# Patient Record
Sex: MALE | Race: WHITE | NOT HISPANIC OR LATINO | Employment: UNEMPLOYED | URBAN - METROPOLITAN AREA
[De-identification: names, ages, dates, MRNs, and addresses within clinical notes are randomized per-mention and may not be internally consistent; named-entity substitution may affect disease eponyms.]

---

## 2017-10-31 ENCOUNTER — HOSPITAL ENCOUNTER (EMERGENCY)
Facility: HOSPITAL | Age: 37
Discharge: HOME | End: 2017-10-31
Attending: EMERGENCY MEDICINE | Admitting: EMERGENCY MEDICINE
Payer: COMMERCIAL

## 2017-10-31 VITALS
RESPIRATION RATE: 17 BRPM | BODY MASS INDEX: 27.98 KG/M2 | TEMPERATURE: 97.9 F | DIASTOLIC BLOOD PRESSURE: 78 MMHG | OXYGEN SATURATION: 100 % | WEIGHT: 225 LBS | SYSTOLIC BLOOD PRESSURE: 146 MMHG | HEART RATE: 83 BPM | HEIGHT: 75 IN

## 2017-10-31 DIAGNOSIS — J45.901 ASTHMA EXACERBATION: Primary | ICD-10-CM

## 2017-10-31 PROCEDURE — 99283 EMERGENCY DEPT VISIT LOW MDM: CPT

## 2017-10-31 PROCEDURE — 94640 AIRWAY INHALATION TREATMENT: CPT

## 2017-10-31 RX ORDER — FLUTICASONE PROPIONATE 110 UG/1
1 AEROSOL, METERED RESPIRATORY (INHALATION) 2 TIMES DAILY
Qty: 1 INHALER | Refills: 0 | Status: SHIPPED | OUTPATIENT
Start: 2017-10-31 | End: 2020-07-20 | Stop reason: CLARIF

## 2017-10-31 RX ORDER — ALBUTEROL SULFATE 2.5 MG/3ML
5 SOLUTION RESPIRATORY (INHALATION) ONCE
Status: COMPLETED | OUTPATIENT
Start: 2017-10-31 | End: 2017-10-31

## 2017-10-31 RX ORDER — ALBUTEROL SULFATE 90 UG/1
2 AEROSOL, METERED RESPIRATORY (INHALATION) EVERY 4 HOURS PRN
Qty: 1 INHALER | Refills: 0 | Status: SHIPPED | OUTPATIENT
Start: 2017-10-31

## 2017-10-31 RX ORDER — ALBUTEROL SULFATE 90 UG/1
2 AEROSOL, METERED RESPIRATORY (INHALATION) ONCE
Status: COMPLETED | OUTPATIENT
Start: 2017-10-31 | End: 2017-10-31

## 2017-10-31 RX ORDER — PREDNISONE 20 MG/1
60 TABLET ORAL ONCE
Status: COMPLETED | OUTPATIENT
Start: 2017-10-31 | End: 2017-10-31

## 2017-10-31 RX ORDER — ALBUTEROL SULFATE 2.5 MG/3ML
SOLUTION RESPIRATORY (INHALATION)
Status: COMPLETED
Start: 2017-10-31 | End: 2017-10-31

## 2017-10-31 RX ADMIN — PREDNISONE 60 MG: 20 TABLET ORAL at 22:35

## 2017-10-31 RX ADMIN — ALBUTEROL SULFATE 5 MG: 2.5 SOLUTION RESPIRATORY (INHALATION) at 22:08

## 2017-10-31 RX ADMIN — ALBUTEROL SULFATE 2 PUFF: 90 AEROSOL, METERED RESPIRATORY (INHALATION) at 22:45

## 2017-10-31 RX ADMIN — IPRATROPIUM BROMIDE 0.5 MG: 0.5 SOLUTION RESPIRATORY (INHALATION) at 22:08

## 2017-10-31 RX ADMIN — Medication 0.5 MG: at 22:08

## 2017-11-01 NOTE — ED PROVIDER NOTES
History  Chief Complaint   Patient presents with    Asthma     Patient reports asthma attack starting at 2115  Patient ran out of albuterol  Patient reports allergic to cat and gave cat bath today     72-year-old male with history of asthma presents with dyspnea, wheezing, and cough after giving his cat a bath  The patient states that he is allergic to cats  Patient has been out of his medications for asthma  He typically takes a daily Advair but he does not know the dose  He is also out of his rescue inhaler  The patient has been admitted to the hospital for asthma exacerbations in the past, he has not been intubated in the past     The patient affirms use of tobacco in the past 90 days  The patient denies any use of illicit drugs, including cocaine  Patient denies any immobilization of at least 3 days or surgery in the past 4 weeks  Patient denies any history of DVT or PE  Patient denies any malignancy with treatment within the past 6 months  Objective  PHYSICAL EXAM:  Constitutional:  No acute distress  Eyes: No scleral icterus or erythema  HENT:  Head normocephalic and atraumatic  Pharynx moist without erythema or exudate  CV:  Normal inspection with no rash, signs of infection, or trauma  Regular rate and rhythm  No murmur  Peripheral pulses intact and equal   Respiratory:  Lungs with decreased air movement, bilateral expiratory wheezing  Abdomen:  Soft, non-tender, non-distended  Back:  No rash or signs of herpes zoster  Skin:  Normal color  Warm and Dry  Extremities:  Non-tender lower extremities without asymmetry; no clinical signs of DVT  No lower extremity edema  Neuro:  Alert  No gross motor deficits  Psych: Normal mood and affect  Medical Decision Making    Dyspnea with a history of asthma and clear inciting event, while there is a broad differential more likely asthma exacerbation  No risk factors for PE  Will reassess after treatment but start corticosteroids  Reassessment:  Patient completely resolved after treatment  Asymptomatic at present  Discussed continued use of inhaled corticosteroids as previously prescribed  I have prescribed fluticasone for the patient and he agrees to follow with his new primary care physician that has been established for him for reassessment  I also prescribed patient a rescue inhaler  We discussed return precautions in detail  History provided by:  Patient  Asthma   Severity:  Moderate  Onset quality:  Sudden  Duration:  1 hour  Timing:  Constant  Progression:  Unchanged  Chronicity:  New  Associated symptoms: cough, shortness of breath and wheezing    Associated symptoms: no abdominal pain, no chest pain, no congestion, no diarrhea, no ear pain, no fatigue, no fever, no headaches, no loss of consciousness, no myalgias, no nausea, no rash, no rhinorrhea, no sore throat and no vomiting        Prior to Admission Medications   Prescriptions Last Dose Informant Patient Reported? Taking?   lidocaine (LIDODERM) 5 %   No No   Sig: Place 1 patch on the skin every 24 hours for 30 days Remove & Discard patch within 12 hours or as directed by MD   methocarbamol (ROBAXIN) 500 mg tablet   No No   Sig: Take 1 tablet by mouth 3 (three) times a day as needed for muscle spasms for up to 30 days Prn muscle spasms      Facility-Administered Medications: None       Past Medical History:   Diagnosis Date    Asthma     Chronic pain     back pain       History reviewed  No pertinent surgical history  History reviewed  No pertinent family history  I have reviewed and agree with the history as documented  Social History   Substance Use Topics    Smoking status: Current Every Day Smoker     Packs/day: 0 50    Smokeless tobacco: Never Used    Alcohol use No        Review of Systems   Constitutional: Negative for fatigue and fever  HENT: Negative for congestion, ear pain, rhinorrhea and sore throat      Respiratory: Positive for cough, shortness of breath and wheezing  Cardiovascular: Negative for chest pain  Gastrointestinal: Negative for abdominal pain, diarrhea, nausea and vomiting  Musculoskeletal: Negative for myalgias  Skin: Negative for rash  Neurological: Negative for loss of consciousness and headaches  All other systems reviewed and are negative        Physical Exam  ED Triage Vitals [10/31/17 2201]   Temperature Pulse Respirations Blood Pressure SpO2   97 9 °F (36 6 °C) 83 22 144/70 93 %      Temp Source Heart Rate Source Patient Position - Orthostatic VS BP Location FiO2 (%)   Temporal Monitor Sitting Right arm --      Pain Score       8           Orthostatic Vital Signs  Vitals:    10/31/17 2201 10/31/17 2215 10/31/17 2237   BP: 144/70  146/78   Pulse: 83 76 83   Patient Position - Orthostatic VS: Sitting  Sitting       Physical Exam    ED Medications  Medications   predniSONE tablet 60 mg (60 mg Oral Given 10/31/17 2235)   albuterol inhalation solution 5 mg (5 mg Nebulization Given 10/31/17 2208)   ipratropium (ATROVENT) 0 02 % inhalation solution 0 5 mg (0 5 mg Nebulization Given 10/31/17 2208)   albuterol (PROVENTIL HFA,VENTOLIN HFA) inhaler 2 puff (2 puffs Inhalation Given 10/31/17 2245)       Diagnostic Studies  Results Reviewed     None                 No orders to display              Procedures  Procedures       Phone Contacts  ED Phone Contact    ED Course  ED Course                          Gilbert' Criteria for PE    Flowsheet Row Most Recent Value   Wells' Criteria for PE   Clinical signs and symptoms of DVT  0 Filed at: 10/31/2017 2220   PE is primary diagnosis or equally likely  0 Filed at: 10/31/2017 2220   HR >100  0 Filed at: 10/31/2017 2220   Immobilization at least 3 days or Surgery in the previous 4 weeks  0 Filed at: 10/31/2017 2220   Previous, objectively diagnosed PE or DVT  0 Filed at: 10/31/2017 2220   Hemoptysis  0 Filed at: 10/31/2017 2220   Malignancy with treatment within 6 months or palliative 0 Filed at: 10/31/2017 2220   Leann Criteria Total  0 Filed at: 10/31/2017 2220            [facility]  CritCare Time    Disposition  Final diagnoses:   Asthma exacerbation     Time reflects when diagnosis was documented in both MDM as applicable and the Disposition within this note     Time User Action Codes Description Comment    10/31/2017 10:39 PM Dacia Rapp [L89 [id number]] Asthma exacerbation       ED Disposition     ED Disposition Condition Comment    Discharge  Ariana Gillespie discharge to home/self care  Condition at discharge: Stable        Follow-up Information    None       Discharge Medication List as of 10/31/2017 10:43 PM      START taking these medications    Details   albuterol (PROVENTIL HFA,VENTOLIN HFA) 90 mcg/act inhaler Inhale 2 puffs every 4 (four) hours as needed for wheezing, Starting Tue 10/31/2017, Print      fluticasone (FLOVENT HFA) 110 MCG/ACT inhaler Inhale 1 puff 2 (two) times a day, Starting Tue 10/31/2017, Print         CONTINUE these medications which have NOT CHANGED    Details   lidocaine (LIDODERM) 5 % Place 1 patch on the skin every 24 hours for 30 days Remove & Discard patch within 12 hours or as directed by MD, Starting 12/12/2016, Until Wed 1/11/17, Print      methocarbamol (ROBAXIN) 500 mg tablet Take 1 tablet by mouth 3 (three) times a day as needed for muscle spasms for up to 30 days Prn muscle spasms, Starting 12/12/2016, Until Wed 1/11/17, Print           No discharge procedures on file      ED Provider  Electronically Signed by           Tramaine Hutchins MD  10/31/17 9586

## 2017-11-01 NOTE — DISCHARGE INSTRUCTIONS
Asthma   WHAT YOU NEED TO KNOW:   Asthma is a lung disease that makes breathing difficult  Chronic inflammation and reactions to triggers narrow the airways in the lungs  Asthma can become life-threatening if it is not managed  DISCHARGE INSTRUCTIONS:   Return to the emergency department if:   · You have severe shortness of breath  · Your lips or nails turn blue or gray  · The skin around your neck and ribs pulls in with each breath  · You have shortness of breath, even after you take your short-term medicine as directed  · Your peak flow numbers are in the red zone of your AAP  Contact your healthcare provider if:   · You run out of medicine before your next refill is due  · Your symptoms get worse  · You need to take more medicine than usual to control your symptoms  · You have questions or concerns about your condition or care  Medicines:   · Medicines  decrease inflammation, open airways, and make it easier to breathe  Medicines may be inhaled, taken as a pill, or injected  Short-term medicines relieve your symptoms quickly  Long-term medicines are used to prevent future attacks  You may also need medicine to help control your allergies  Ask your healthcare provider for more information about the medicine you are given and how to take it safely  · Take your medicine as directed  Contact your healthcare provider if you think your medicine is not helping or if you have side effects  Tell him of her if you are allergic to any medicine  Keep a list of the medicines, vitamins, and herbs you take  Include the amounts, and when and why you take them  Bring the list or the pill bottles to follow-up visits  Carry your medicine list with you in case of an emergency  Follow up with your healthcare provider as directed: You will need to return to make sure your medicine is working and your symptoms are controlled   You may be referred to an asthma specialist  Martha Vega may be asked to keep a record of your peak flow values and bring it with you to your appointments  Write down your questions so you remember to ask them during your visits  Manage your symptoms and prevent future attacks:   · Follow your Asthma Action Plan (AAP)  This is a written plan that you and your healthcare provider create  It explains which medicine you need and when to change doses if necessary  It also explains how you can monitor symptoms and use a peak flow meter  The meter measures how well your lungs are working  · Manage other health conditions , such as allergies, acid reflux, and sleep apnea  · Identify and avoid triggers  These may include pets, dust mites, mold, and cockroaches  · Do not smoke or be around others who smoke  Nicotine and other chemicals in cigarettes and cigars can cause lung damage  Ask your healthcare provider for information if you currently smoke and need help to quit  E-cigarettes or smokeless tobacco still contain nicotine  Talk to your healthcare provider before you use these products  · Ask about the flu vaccine  The flu can make your asthma worse  You may need a yearly flu shot  © 2017 [facility] [facility] Information is for End User's use only and may not be sold, redistributed or otherwise used for commercial purposes  All illustrations and images included in CareNotes® are the copyrighted property of A D A M , Inc  or Ari Rodriguez  The above information is an  only  It is not intended as medical advice for individual conditions or treatments  Talk to your doctor, nurse or pharmacist before following any medical regimen to see if it is safe and effective for you

## 2018-10-11 ENCOUNTER — HOSPITAL ENCOUNTER (EMERGENCY)
Facility: HOSPITAL | Age: 38
Discharge: HOME | End: 2018-10-11
Attending: EMERGENCY MEDICINE | Admitting: EMERGENCY MEDICINE
Payer: COMMERCIAL

## 2018-10-11 ENCOUNTER — APPOINTMENT (EMERGENCY)
Dept: RADIOLOGY | Facility: HOSPITAL | Age: 38
End: 2018-10-11
Payer: COMMERCIAL

## 2018-10-11 VITALS
DIASTOLIC BLOOD PRESSURE: 58 MMHG | RESPIRATION RATE: 16 BRPM | HEART RATE: 66 BPM | TEMPERATURE: 97.9 F | SYSTOLIC BLOOD PRESSURE: 122 MMHG | OXYGEN SATURATION: 99 %

## 2018-10-11 DIAGNOSIS — J20.9 ACUTE BRONCHITIS WITH BRONCHOSPASM: Primary | ICD-10-CM

## 2018-10-11 DIAGNOSIS — J45.901 ASTHMA EXACERBATION: ICD-10-CM

## 2018-10-11 DIAGNOSIS — J06.9 UPPER RESPIRATORY INFECTION WITH COUGH AND CONGESTION: ICD-10-CM

## 2018-10-11 PROCEDURE — 94640 AIRWAY INHALATION TREATMENT: CPT

## 2018-10-11 PROCEDURE — 99283 EMERGENCY DEPT VISIT LOW MDM: CPT

## 2018-10-11 PROCEDURE — 71046 X-RAY EXAM CHEST 2 VIEWS: CPT

## 2018-10-11 RX ORDER — AZITHROMYCIN 250 MG/1
TABLET, FILM COATED ORAL
Qty: 6 TABLET | Refills: 0 | Status: SHIPPED | OUTPATIENT
Start: 2018-10-11 | End: 2018-10-15

## 2018-10-11 RX ORDER — PSEUDOEPHEDRINE HCL 60 MG/1
60 TABLET ORAL EVERY 6 HOURS PRN
Qty: 20 TABLET | Refills: 0 | Status: ON HOLD | OUTPATIENT
Start: 2018-10-11 | End: 2018-10-13

## 2018-10-11 RX ORDER — PREDNISONE 20 MG/1
60 TABLET ORAL ONCE
Status: COMPLETED | OUTPATIENT
Start: 2018-10-11 | End: 2018-10-11

## 2018-10-11 RX ORDER — PSEUDOEPHEDRINE HYDROCHLORIDE 30 MG/1
60 TABLET ORAL ONCE
Status: COMPLETED | OUTPATIENT
Start: 2018-10-11 | End: 2018-10-11

## 2018-10-11 RX ORDER — PREDNISONE 20 MG/1
60 TABLET ORAL DAILY
Qty: 12 TABLET | Refills: 0 | Status: SHIPPED | OUTPATIENT
Start: 2018-10-12 | End: 2018-10-16

## 2018-10-11 RX ORDER — ALBUTEROL SULFATE 90 UG/1
2 AEROSOL, METERED RESPIRATORY (INHALATION) ONCE
Status: COMPLETED | OUTPATIENT
Start: 2018-10-11 | End: 2018-10-11

## 2018-10-11 RX ORDER — GUAIFENESIN 600 MG
1200 TABLET, EXTENDED RELEASE 12 HR ORAL ONCE
Status: COMPLETED | OUTPATIENT
Start: 2018-10-11 | End: 2018-10-11

## 2018-10-11 RX ADMIN — ALBUTEROL SULFATE 5 MG: 2.5 SOLUTION RESPIRATORY (INHALATION) at 15:08

## 2018-10-11 RX ADMIN — PREDNISONE 60 MG: 20 TABLET ORAL at 15:08

## 2018-10-11 RX ADMIN — IPRATROPIUM BROMIDE 0.5 MG: 0.5 SOLUTION RESPIRATORY (INHALATION) at 15:08

## 2018-10-11 RX ADMIN — PSEUDOEPHEDRINE HCL 60 MG: 30 TABLET, COATED ORAL at 15:08

## 2018-10-11 RX ADMIN — ALBUTEROL SULFATE 2 PUFF: 90 AEROSOL, METERED RESPIRATORY (INHALATION) at 15:09

## 2018-10-11 RX ADMIN — GUAIFENESIN 1200 MG: 600 TABLET, EXTENDED RELEASE ORAL at 15:08

## 2018-10-11 NOTE — ED NOTES
Pt started with cold like symptoms one week ago, sick family contacts in household  Progressively worsening  Took daughter's prednisone 10mg Sunday, 5mg Monday and 5mg Tuesday  Reported improvement  Today now worsening symptoms  Out of rescue inhaler       Ty Anderson RN  10/11/18 5660

## 2018-10-11 NOTE — ED PROVIDER NOTES
History  Chief Complaint   Patient presents with    Cold Like Symptoms     pt c/o nasal congestion, fever, cough since saturday     Patient is a 45-year-old male with past medical history of asthma and chronic back pain, presents to the emergency department complaining of cold-like symptoms for the past week  Patient states since last Thursday he has been having nasal congestion, cough, intermittent dizziness and chest tightness  He states on Sunday his chest felt very tight but he did not have his rescue inhaler and instead used Advair  He reports over the past 2 days he has spiked fevers with T-max of 102° F  He has been taking [place] cold relief at home  He continues to feel mild chest tightness and dyspnea  He also reports looser than normal stool which is nonbloody and 1 episode of nonbilious, nonbloody posttussive emesis  He states his child has been sick with similar URI symptoms  He does report mild frontal headache associated with the symptoms  He denies any visual disturbance or eye pain, diaphoresis, neck pain or stiffness, ear pain or discharge, chest pain other than the tightness feeling, palpitations, abdominal pain, nausea, blood per rectum or melena, dysuria, change in urinary frequency, hematuria, flank pain, skin rash or color change, extremity weakness or paresthesia or other focal neurologic deficits  History provided by:  Patient   used: No        Prior to Admission Medications   Prescriptions Last Dose Informant Patient Reported? Taking?    albuterol (PROVENTIL HFA,VENTOLIN HFA) 90 mcg/act inhaler   No No   Sig: Inhale 2 puffs every 4 (four) hours as needed for wheezing   fluticasone (FLOVENT HFA) 110 MCG/ACT inhaler   No No   Sig: Inhale 1 puff 2 (two) times a day   lidocaine (LIDODERM) 5 %   No No   Sig: Place 1 patch on the skin every 24 hours for 30 days Remove & Discard patch within 12 hours or as directed by MD   methocarbamol (ROBAXIN) 500 mg tablet   No No   Sig: Take 1 tablet by mouth 3 (three) times a day as needed for muscle spasms for up to 30 days Prn muscle spasms      Facility-Administered Medications: None       Past Medical History:   Diagnosis Date    Asthma     Chronic pain     back pain       History reviewed  No pertinent surgical history  History reviewed  No pertinent family history  I have reviewed and agree with the history as documented  Social History   Substance Use Topics    Smoking status: Current Every Day Smoker     Packs/day: 0 25    Smokeless tobacco: Never Used    Alcohol use No        Review of Systems   Constitutional: Positive for chills, fatigue and fever  Negative for diaphoresis  HENT: Positive for congestion, rhinorrhea and sore throat  Negative for ear pain, hearing loss and tinnitus  Eyes: Negative for photophobia, pain and visual disturbance  Respiratory: Positive for cough, chest tightness and shortness of breath  Negative for wheezing  Cardiovascular: Negative for chest pain, palpitations and leg swelling  Gastrointestinal: Positive for diarrhea and vomiting  Negative for abdominal distention, abdominal pain, blood in stool, constipation and nausea  Genitourinary: Negative for difficulty urinating, dysuria, flank pain, frequency and hematuria  Musculoskeletal: Negative for back pain, neck pain and neck stiffness  Skin: Negative for color change, pallor, rash and wound  Allergic/Immunologic: Negative for immunocompromised state  Neurological: Positive for dizziness and headaches  Negative for syncope, weakness, light-headedness and numbness  Hematological: Negative for adenopathy  Psychiatric/Behavioral: Negative for confusion and decreased concentration  All other systems reviewed and are negative  Physical Exam  Physical Exam   Constitutional: He is oriented to person, place, and time  He appears well-developed and well-nourished  No distress     HENT:   Head: Normocephalic and atraumatic  Right Ear: External ear normal    Left Ear: External ear normal    Mouth/Throat: No oropharyngeal exudate  Mucous membranes moist   Posterior pharyngeal erythema but no significant tonsillar hypertrophy or exudate  Uvula midline  + Nasal congestion  Eyes: Pupils are equal, round, and reactive to light  Conjunctivae and EOM are normal    Neck: Normal range of motion  Neck supple  No JVD present  Cardiovascular: Normal rate, regular rhythm, normal heart sounds and intact distal pulses  Exam reveals no gallop and no friction rub  No murmur heard  Pulmonary/Chest: Effort normal  No respiratory distress  He has no wheezes  He has no rales  He exhibits no tenderness  Coarse breath sounds throughout  Abdominal: Soft  Bowel sounds are normal  He exhibits no distension  There is no tenderness  There is no rebound and no guarding  Musculoskeletal: Normal range of motion  He exhibits no edema or tenderness  Lymphadenopathy:     He has no cervical adenopathy  Neurological: He is alert and oriented to person, place, and time  No cranial nerve deficit  No gross motor or sensory deficits  Skin: Skin is warm and dry  No rash noted  He is not diaphoretic  No pallor  Psychiatric: He has a normal mood and affect  His behavior is normal    Nursing note and vitals reviewed        Vital Signs  ED Triage Vitals   Temperature Pulse Respirations Blood Pressure SpO2   10/11/18 1324 10/11/18 1323 10/11/18 1323 10/11/18 1323 10/11/18 1323   97 9 °F (36 6 °C) 66 16 122/58 99 %      Temp Source Heart Rate Source Patient Position - Orthostatic VS BP Location FiO2 (%)   10/11/18 1324 10/11/18 1323 10/11/18 1323 10/11/18 1323 --   Oral Monitor Sitting Left arm       Pain Score       10/11/18 1323       No Pain           Vitals:    10/11/18 1323   BP: 122/58   Pulse: 66   Patient Position - Orthostatic VS: Sitting       Visual Acuity      ED Medications  Medications   albuterol (PROVENTIL HFA,VENTOLIN HFA) inhaler 2 puff (2 puffs Inhalation Given 10/11/18 1509)   predniSONE tablet 60 mg (60 mg Oral Given 10/11/18 1508)   albuterol inhalation solution 5 mg (5 mg Nebulization Given 10/11/18 1508)   ipratropium (ATROVENT) 0 02 % inhalation solution 0 5 mg (0 5 mg Nebulization Given 10/11/18 1508)   pseudoephedrine (SUDAFED) tablet 60 mg (60 mg Oral Given 10/11/18 1508)   guaiFENesin (MUCINEX) 12 hr tablet 1,200 mg (1,200 mg Oral Given 10/11/18 1508)       Diagnostic Studies  Results Reviewed     None                 X-ray chest 2 views   ED Interpretation by Santiago Meehan DO (10/11 1600)   No acute abnormality in the chest                  Procedures  Procedures       Phone Contacts  ED Phone Contact    ED Course  ED Course as of Oct 11 1605   Thu Oct 11, 2018   1600 Patient reassessed after neb treatment states he is feeling better  Chest x-ray unremarkable but based on symptoms and new fever, will cover with a course of azithromycin  Will refer to PCP  Discussed ED return parameters  MDM  Number of Diagnoses or Management Options  Diagnosis management comments: 51-year-old male presents with 1 week of progressively worsening URI symptoms, cough and bronchospasm  Most likely patient has viral syndrome, possibly pneumonia  Will obtain chest x-ray to rule out pneumonia  Will also provide DuoNeb breathing treatment, prednisone, Sudafed and Mucinex for symptomatic relief  Offered ibuprofen and/or Tylenol for his pain however he declined  Will most likely send home with a short course of prednisone, albuterol inhaler and scripts for Sudafed and Mucinex  If there is evidence of pneumonia, will start outpatient oral antibiotics           Amount and/or Complexity of Data Reviewed  Tests in the radiology section of CPT®: ordered and reviewed  Independent visualization of images, tracings, or specimens: yes      CritCare Time    Disposition  Final diagnoses:   Acute bronchitis with bronchospasm   Asthma exacerbation   Upper respiratory infection with cough and congestion     Time reflects when diagnosis was documented in both MDM as applicable and the Disposition within this note     Time User Action Codes Description Comment    10/11/2018  4:01 PM Hansa Rapp [J20 9] Acute bronchitis with bronchospasm     10/11/2018  4:01 PM Hansa HERNANDEZ Add [J45 901] Asthma exacerbation     10/11/2018  4:01 PM Hansa HERNANDEZ Add [J06 9] Upper respiratory infection with cough and congestion       ED Disposition     ED Disposition Condition Comment    Discharge  Lopez Norman discharge to home/self care  Condition at discharge: Stable        Follow-up Information     Follow up With Specialties Details Why Contact Info Additional Information    Infolink  Call To establish care with a primary care doctor if you do not already have one [facility] Emergency Medicine Go to If symptoms worsen [address] [facility] [address] ED, [address] [place] [address], [phone]          Patient's Medications   Discharge Prescriptions    AZITHROMYCIN (ZITHROMAX) 250 MG TABLET    Take 2 tablets today then 1 tablet daily x 4 days       Start Date: 10/11/2018End Date: 10/15/2018       Order Dose: --       Quantity: 6 tablet    Refills: 0    PREDNISONE 20 MG TABLET    Take 3 tablets (60 mg total) by mouth daily for 4 days       Start Date: 10/12/2018End Date: 10/16/2018       Order Dose: 60 mg       Quantity: 12 tablet    Refills: 0    PSEUDOEPHEDRINE (SUDAFED) 60 MG TABLET    Take 1 tablet (60 mg total) by mouth every 6 (six) hours as needed for congestion       Start Date: 10/11/2018End Date: --       Order Dose: 60 mg       Quantity: 20 tablet    Refills: 0     No discharge procedures on file      ED Provider  Electronically Signed by           Enmanuel Li DO  10/11/18 6400

## 2018-10-11 NOTE — DISCHARGE INSTRUCTIONS
Asthma   WHAT YOU NEED TO KNOW:   Asthma is a lung disease that makes breathing difficult  Chronic inflammation and reactions to triggers narrow the airways in the lungs  Asthma can become life-threatening if it is not managed  DISCHARGE INSTRUCTIONS:   Seek care immediately if:   · You have severe shortness of breath  · Your lips or nails turn blue or gray  · The skin around your neck and ribs pulls in with each breath  · You have shortness of breath, even after you take your short-term medicine as directed  · Your peak flow numbers are in the red zone of your AAP  Contact your healthcare provider if:   · You run out of medicine before your next refill is due  · Your symptoms get worse  · You need to take more medicine than usual to control your symptoms  · You have questions or concerns about your condition or care  Medicines:   · Medicines  decrease inflammation, open airways, and make it easier to breathe  Medicines may be inhaled, taken as a pill, or injected  Short-term medicines relieve your symptoms quickly  Long-term medicines are used to prevent future attacks  You may also need medicine to help control your allergies  Ask your healthcare provider for more information about the medicine you are given and how to take it safely  · Take your medicine as directed  Contact your healthcare provider if you think your medicine is not helping or if you have side effects  Tell him or her if you are allergic to any medicine  Keep a list of the medicines, vitamins, and herbs you take  Include the amounts, and when and why you take them  Bring the list or the pill bottles to follow-up visits  Carry your medicine list with you in case of an emergency  Follow up with your healthcare provider as directed: You will need to return to make sure your medicine is working and your symptoms are controlled   You may be referred to an asthma specialist  Yonas Juan may be asked to keep a record of your peak flow values and bring it with you to your appointments  Write down your questions so you remember to ask them  Manage your symptoms and prevent future attacks:   · Follow your Asthma Action Plan (AAP)  This is a written plan that you and your healthcare provider create  It explains which medicine you need and when to change doses if necessary  It also explains how you can monitor symptoms and use a peak flow meter  The meter measures how well your lungs are working  · Manage other health conditions , such as allergies, acid reflux, and sleep apnea  · Identify and avoid triggers  These may include pets, dust mites, mold, and cockroaches  · Do not smoke or be around others who smoke  Nicotine and other chemicals in cigarettes and cigars can cause lung damage  Ask your healthcare provider for information if you currently smoke and need help to quit  E-cigarettes or smokeless tobacco still contain nicotine  Talk to your healthcare provider before you use these products  · Ask about the flu vaccine  The flu can make your asthma worse  You may need a yearly flu shot  © 2017 [facility] Information is for End User's use only and may not be sold, redistributed or otherwise used for commercial purposes  All illustrations and images included in CareNotes® are the copyrighted property of [employer]  or [employer]  The above information is an  only  It is not intended as medical advice for individual conditions or treatments  Talk to your doctor, nurse or pharmacist before following any medical regimen to see if it is safe and effective for you  Upper Respiratory Infection   WHAT YOU NEED TO KNOW:   An upper respiratory infection is also called the common cold  It is an infection that can affect your nose, throat, ears, and sinuses  For healthy people, the common cold is usually not serious and does not need special treatment   Cold symptoms are usually worst for the first 3 to 5 days  Most people get better in 7 to 14 days  You may continue to cough for 2 to 3 weeks  Colds are caused by viruses and do not get better with antibiotics  DISCHARGE INSTRUCTIONS:   Seek care immediately if:   · You have chest pain or trouble breathing  Contact your healthcare provider if:   · You have a fever over 102ºF (39°C)  · Your sore throat gets worse or you see white or yellow spots in your throat  · Your symptoms get worse after 3 to 5 days or your cold is not better in 14 days  · You have a rash anywhere on your skin  · You have large, tender lumps in your neck  · You have thick, green, or yellow drainage from your nose  · You cough up thick yellow, green, or bloody mucus  · You are vomiting for more than 24 hours and cannot keep fluids down  · You have a bad earache  · You have questions or concerns about your condition or care  Medicines: You may need any of the following:  · Decongestants  help reduce nasal congestion and help you breathe more easily  If you take decongestant pills, they may make you feel restless or cause problems with your sleep  Do not use decongestant sprays for more than a few days  · Cough suppressants  help reduce coughing  Ask your healthcare provider which type of cough medicine is best for you  · NSAIDs , such as ibuprofen, help decrease swelling, pain, and fever  NSAIDs can cause stomach bleeding or kidney problems in certain people  If you take blood thinner medicine, always ask your healthcare provider if NSAIDs are safe for you  Always read the medicine label and follow directions  · Acetaminophen  decreases pain and fever  It is available without a doctor's order  Ask how much to take and how often to take it  Follow directions   Read the labels of all other medicines you are using to see if they also contain acetaminophen, or ask your doctor or pharmacist  Acetaminophen can cause liver damage if not taken correctly  Do not use more than 4 grams (4,000 milligrams) total of acetaminophen in one day  · Take your medicine as directed  Contact your healthcare provider if you think your medicine is not helping or if you have side effects  Tell him or her if you are allergic to any medicine  Keep a list of the medicines, vitamins, and herbs you take  Include the amounts, and when and why you take them  Bring the list or the pill bottles to follow-up visits  Carry your medicine list with you in case of an emergency  Follow up with your healthcare provider as directed:  Write down your questions so you remember to ask them during your visits  Self-care:   · Rest as much as possible  Slowly start to do more each day  · Drink more liquids as directed  Liquids will help thin and loosen mucus so you can cough it up  Liquids will also help prevent dehydration  Liquids that help prevent dehydration include water, fruit juice, and broth  Do not drink liquids that contain caffeine  Caffeine can increase your risk for dehydration  Ask your healthcare provider how much liquid to drink each day  · Soothe a sore throat  Gargle with warm salt water  This helps your sore throat feel better  Make salt water by dissolving ¼ teaspoon salt in 1 cup warm water  You may also suck on hard candy or throat lozenges  You may use a sore throat spray  · Use a humidifier or vaporizer  Use a cool mist humidifier or a vaporizer to increase air moisture in your home  This may make it easier for you to breathe and help decrease your cough  · Use saline nasal drops as directed  These help relieve congestion  · Apply petroleum-based jelly around the outside of your nostrils  This can decrease irritation from blowing your nose  · Do not smoke  Nicotine and other chemicals in cigarettes and cigars can make your symptoms worse  They can also cause infections such as bronchitis or pneumonia   Ask your healthcare provider for information if you currently smoke and need help to quit  E-cigarettes or smokeless tobacco still contain nicotine  Talk to your healthcare provider before you use these products  Prevent spreading your cold to others:   · Try to stay away from other people during the first 2 to 3 days of your cold when it is more easily spread  · Do not share food or drinks  · Do not share hand towels with household members  · Wash your hands often, especially after you blow your nose  Turn away from other people and cover your mouth and nose with a tissue when you sneeze or cough  © 2017 [address] [employer]  Information is for End User's use only and may not be sold, redistributed or otherwise used for commercial purposes  All illustrations and images included in CareNotes® are the copyrighted property of [employer]  or Ari Rodriguez  The above information is an  only  It is not intended as medical advice for individual conditions or treatments  Talk to your doctor, nurse or pharmacist before following any medical regimen to see if it is safe and effective for you  Acute Bronchitis   WHAT YOU NEED TO KNOW:   Acute bronchitis is swelling and irritation in the air passages of your lungs  This irritation may cause you to cough or have other breathing problems  Acute bronchitis often starts because of another illness, such as a cold or the flu  The illness spreads from your nose and throat to your windpipe and airways  Bronchitis is often called a chest cold  Acute bronchitis lasts about 3 to 6 weeks and is usually not a serious illness  Your cough can last for several weeks  DISCHARGE INSTRUCTIONS:   Return to the emergency department if:   · You cough up blood  · Your lips or fingernails turn blue  · You feel like you are not getting enough air when you breathe  Contact your healthcare provider if:   · You have a fever      · Your breathing problems do not go away or get worse  · Your cough does not get better within 4 weeks  · You have questions or concerns about your condition or care  Self-care:   · Get more rest   Rest helps your body to heal  Slowly start to do more each day  Rest when you feel it is needed  · Avoid irritants in the air  Avoid chemicals, fumes, and dust  Wear a face mask if you must work around dust or fumes  Stay inside on days when air pollution levels are high  If you have allergies, stay inside when pollen counts are high  Do not use aerosol products, such as spray-on deodorant, bug spray, and hair spray  · Do not smoke or be around others who smoke  Nicotine and other chemicals in cigarettes and cigars damages the cilia that move mucus out of your lungs  Ask your healthcare provider for information if you currently smoke and need help to quit  E-cigarettes or smokeless tobacco still contain nicotine  Talk to your healthcare provider before you use these products  · Drink liquids as directed  Liquids help keep your air passages moist and help you cough up mucus  You may need to drink more liquids when you have acute bronchitis  Ask how much liquid to drink each day and which liquids are best for you  · Use a humidifier or vaporizer  Use a cool mist humidifier or a vaporizer to increase air moisture in your home  This may make it easier for you to breathe and help decrease your cough  Decrease risk for acute bronchitis:   · Get the vaccinations you need  Ask your healthcare provider if you should get vaccinated against the flu or pneumonia  · Prevent the spread of germs  You can decrease your risk of acute bronchitis and other illnesses by doing the following:     [facility] your hands often with soap and water  Carry germ-killing hand lotion or gel with you  You can use the lotion or gel to clean your hands when soap and water are not available      ¨ Do not touch your eyes, nose, or mouth unless you have washed your hands first     ¨ Always cover your mouth when you cough to prevent the spread of germs  It is best to cough into a tissue or your shirt sleeve instead of into your hand  Ask those around you cover their mouths when they cough  ¨ Try to avoid people who have a cold or the flu  If you are sick, stay away from others as much as possible  Medicines: Your healthcare provider may  give you any of the following:  · Ibuprofen or acetaminophen  are medicines that help lower your fever  They are available without a doctor's order  Ask your healthcare provider which medicine is right for you  Ask how much to take and how often to take it  Follow directions  These medicines can cause stomach bleeding if not taken correctly  Ibuprofen can cause kidney damage  Do not take ibuprofen if you have kidney disease, an ulcer, or allergies to aspirin  Acetaminophen can cause liver damage  Do not take more than 4,000 milligrams in 24 hours  · Decongestants  help loosen mucus in your lungs and make it easier to cough up  This can help you breathe easier  · Cough suppressants  decrease your urge to cough  If your cough produces mucus, do not take a cough suppressant unless your healthcare provider tells you to  Your healthcare provider may suggest that you take a cough suppressant at night so you can rest     · Inhalers  may be given  Your healthcare provider may give you one or more inhalers to help you breathe easier and cough less  An inhaler gives your medicine to open your airways  Ask your healthcare provider to show you how to use your inhaler correctly  · Take your medicine as directed  Contact your healthcare provider if you think your medicine is not helping or if you have side effects  Tell him of her if you are allergic to any medicine  Keep a list of the medicines, vitamins, and herbs you take  Include the amounts, and when and why you take them   Bring the list or the pill bottles to follow-up visits  Carry your medicine list with you in case of an emergency  Follow up with your healthcare provider as directed:  Write down questions you have so you will remember to ask them during your follow-up visits  © 2017 [address] [employer] Information is for End User's use only and may not be sold, redistributed or otherwise used for commercial purposes  All illustrations and images included in CareNotes® are the copyrighted property of [employer]  or Ari Rodriguez  The above information is an  only  It is not intended as medical advice for individual conditions or treatments  Talk to your doctor, nurse or pharmacist before following any medical regimen to see if it is safe and effective for you

## 2018-10-13 ENCOUNTER — HOSPITAL ENCOUNTER (INPATIENT)
Facility: HOSPITAL | Age: 38
LOS: 1 days | Discharge: HOME | DRG: 241 | End: 2018-10-13
Attending: EMERGENCY MEDICINE | Admitting: INTERNAL MEDICINE
Payer: COMMERCIAL

## 2018-10-13 ENCOUNTER — APPOINTMENT (EMERGENCY)
Dept: CT IMAGING | Facility: HOSPITAL | Age: 38
DRG: 241 | End: 2018-10-13
Payer: COMMERCIAL

## 2018-10-13 ENCOUNTER — APPOINTMENT (EMERGENCY)
Dept: RADIOLOGY | Facility: HOSPITAL | Age: 38
DRG: 241 | End: 2018-10-13
Payer: COMMERCIAL

## 2018-10-13 VITALS
RESPIRATION RATE: 18 BRPM | HEART RATE: 58 BPM | OXYGEN SATURATION: 98 % | TEMPERATURE: 98.4 F | DIASTOLIC BLOOD PRESSURE: 70 MMHG | HEIGHT: 74 IN | WEIGHT: 224.87 LBS | BODY MASS INDEX: 28.86 KG/M2 | SYSTOLIC BLOOD PRESSURE: 135 MMHG

## 2018-10-13 DIAGNOSIS — E87.2 LACTIC ACIDOSIS: ICD-10-CM

## 2018-10-13 DIAGNOSIS — R10.9 ABDOMINAL PAIN, UNSPECIFIED ABDOMINAL LOCATION: ICD-10-CM

## 2018-10-13 DIAGNOSIS — R11.2 INTRACTABLE NAUSEA AND VOMITING: Primary | ICD-10-CM

## 2018-10-13 DIAGNOSIS — R10.9 ABDOMINAL PAIN: ICD-10-CM

## 2018-10-13 PROBLEM — J40 TRACHEOBRONCHITIS: Status: ACTIVE | Noted: 2018-10-13

## 2018-10-13 PROBLEM — F12.10 MARIJUANA ABUSE: Status: ACTIVE | Noted: 2018-10-13

## 2018-10-13 PROBLEM — Z72.0 TOBACCO ABUSE: Status: ACTIVE | Noted: 2018-10-13

## 2018-10-13 PROBLEM — E87.6 HYPOKALEMIA: Status: ACTIVE | Noted: 2018-10-13

## 2018-10-13 PROBLEM — E87.20 LACTIC ACIDOSIS: Status: ACTIVE | Noted: 2018-10-13

## 2018-10-13 PROBLEM — D72.829 LEUKOCYTOSIS: Status: ACTIVE | Noted: 2018-10-13

## 2018-10-13 LAB
ALBUMIN SERPL BCP-MCNC: 4.3 G/DL (ref 3.5–5)
ALP SERPL-CCNC: 75 U/L (ref 46–116)
ALT SERPL W P-5'-P-CCNC: 29 U/L (ref 12–78)
AMPHETAMINES SERPL QL SCN: NEGATIVE
ANION GAP SERPL CALCULATED.3IONS-SCNC: 12 MMOL/L (ref 4–13)
AST SERPL W P-5'-P-CCNC: 14 U/L (ref 5–45)
BACTERIA UR QL AUTO: ABNORMAL /HPF
BARBITURATES UR QL: NEGATIVE
BASOPHILS # BLD AUTO: 0.05 THOUSANDS/ΜL (ref 0–0.1)
BASOPHILS NFR BLD AUTO: 0 % (ref 0–1)
BENZODIAZ UR QL: NEGATIVE
BILIRUB SERPL-MCNC: 0.5 MG/DL (ref 0.2–1)
BILIRUB UR QL STRIP: NEGATIVE
BUN SERPL-MCNC: 9 MG/DL (ref 5–25)
CALCIUM SERPL-MCNC: 9.9 MG/DL (ref 8.3–10.1)
CHLORIDE SERPL-SCNC: 99 MMOL/L (ref 100–108)
CK SERPL-CCNC: 137 U/L (ref 39–308)
CLARITY UR: CLEAR
CO2 SERPL-SCNC: 24 MMOL/L (ref 21–32)
COCAINE UR QL: NEGATIVE
COLOR UR: YELLOW
CREAT SERPL-MCNC: 1.15 MG/DL (ref 0.6–1.3)
EOSINOPHIL # BLD AUTO: 0 THOUSAND/ΜL (ref 0–0.61)
EOSINOPHIL NFR BLD AUTO: 0 % (ref 0–6)
ERYTHROCYTE [DISTWIDTH] IN BLOOD BY AUTOMATED COUNT: 13.4 % (ref 11.6–15.1)
ETHANOL SERPL-MCNC: <3 MG/DL (ref 0–3)
GFR SERPL CREATININE-BSD FRML MDRD: 81 ML/MIN/1.73SQ M
GLUCOSE SERPL-MCNC: 128 MG/DL (ref 65–140)
GLUCOSE UR STRIP-MCNC: NEGATIVE MG/DL
HCT VFR BLD AUTO: 44.4 % (ref 36.5–49.3)
HGB BLD-MCNC: 14.8 G/DL (ref 12–17)
HGB UR QL STRIP.AUTO: NEGATIVE
HOLD SPECIMEN: NORMAL
IMM GRANULOCYTES # BLD AUTO: 0.04 THOUSAND/UL (ref 0–0.2)
IMM GRANULOCYTES NFR BLD AUTO: 0 % (ref 0–2)
KETONES UR STRIP-MCNC: NEGATIVE MG/DL
LACTATE SERPL-SCNC: 1.5 MMOL/L (ref 0.5–2)
LACTATE SERPL-SCNC: 2 MMOL/L (ref 0.5–2)
LACTATE SERPL-SCNC: 2.7 MMOL/L (ref 0.5–2)
LACTATE SERPL-SCNC: 5 MMOL/L (ref 0.5–2)
LEUKOCYTE ESTERASE UR QL STRIP: NEGATIVE
LIPASE SERPL-CCNC: 128 U/L (ref 73–393)
LYMPHOCYTES # BLD AUTO: 2.65 THOUSANDS/ΜL (ref 0.6–4.47)
LYMPHOCYTES NFR BLD AUTO: 21 % (ref 14–44)
MAGNESIUM SERPL-MCNC: 1.8 MG/DL (ref 1.6–2.6)
MCH RBC QN AUTO: 27.1 PG (ref 26.8–34.3)
MCHC RBC AUTO-ENTMCNC: 33.3 G/DL (ref 31.4–37.4)
MCV RBC AUTO: 81 FL (ref 82–98)
METHADONE UR QL: NEGATIVE
MONOCYTES # BLD AUTO: 1.04 THOUSAND/ΜL (ref 0.17–1.22)
MONOCYTES NFR BLD AUTO: 8 % (ref 4–12)
NEUTROPHILS # BLD AUTO: 8.68 THOUSANDS/ΜL (ref 1.85–7.62)
NEUTS SEG NFR BLD AUTO: 71 % (ref 43–75)
NITRITE UR QL STRIP: NEGATIVE
NON-SQ EPI CELLS URNS QL MICRO: ABNORMAL /HPF
NRBC BLD AUTO-RTO: 0 /100 WBCS
OB PNL GAST: NEGATIVE
OPIATES UR QL SCN: NEGATIVE
PCP UR QL: NEGATIVE
PH UR STRIP.AUTO: 8.5 [PH] (ref 4.5–8)
PLATELET # BLD AUTO: 234 THOUSANDS/UL (ref 149–390)
PLATELET # BLD AUTO: 324 THOUSANDS/UL (ref 149–390)
PMV BLD AUTO: 10.7 FL (ref 8.9–12.7)
PMV BLD AUTO: 10.7 FL (ref 8.9–12.7)
POTASSIUM SERPL-SCNC: 3.2 MMOL/L (ref 3.5–5.3)
POTASSIUM SERPL-SCNC: 3.9 MMOL/L (ref 3.5–5.3)
PROCALCITONIN SERPL-MCNC: <0.05 NG/ML
PROT SERPL-MCNC: 7.8 G/DL (ref 6.4–8.2)
PROT UR STRIP-MCNC: NEGATIVE MG/DL
RBC # BLD AUTO: 5.46 MILLION/UL (ref 3.88–5.62)
RBC #/AREA URNS AUTO: ABNORMAL /HPF
SODIUM SERPL-SCNC: 135 MMOL/L (ref 136–145)
SP GR UR STRIP.AUTO: 1.02 (ref 1–1.03)
THC UR QL: POSITIVE
TROPONIN I SERPL-MCNC: <0.02 NG/ML
TROPONIN I SERPL-MCNC: <0.02 NG/ML
UROBILINOGEN UR QL STRIP.AUTO: 0.2 E.U./DL
WBC # BLD AUTO: 12.46 THOUSAND/UL (ref 4.31–10.16)
WBC #/AREA URNS AUTO: ABNORMAL /HPF

## 2018-10-13 PROCEDURE — 84132 ASSAY OF SERUM POTASSIUM: CPT | Performed by: INTERNAL MEDICINE

## 2018-10-13 PROCEDURE — 93005 ELECTROCARDIOGRAM TRACING: CPT

## 2018-10-13 PROCEDURE — C9113 INJ PANTOPRAZOLE SODIUM, VIA: HCPCS | Performed by: INTERNAL MEDICINE

## 2018-10-13 PROCEDURE — 80320 DRUG SCREEN QUANTALCOHOLS: CPT | Performed by: EMERGENCY MEDICINE

## 2018-10-13 PROCEDURE — 83605 ASSAY OF LACTIC ACID: CPT | Performed by: INTERNAL MEDICINE

## 2018-10-13 PROCEDURE — 81001 URINALYSIS AUTO W/SCOPE: CPT | Performed by: EMERGENCY MEDICINE

## 2018-10-13 PROCEDURE — 85049 AUTOMATED PLATELET COUNT: CPT | Performed by: INTERNAL MEDICINE

## 2018-10-13 PROCEDURE — 99223 1ST HOSP IP/OBS HIGH 75: CPT | Performed by: INTERNAL MEDICINE

## 2018-10-13 PROCEDURE — 83690 ASSAY OF LIPASE: CPT

## 2018-10-13 PROCEDURE — 85025 COMPLETE CBC W/AUTO DIFF WBC: CPT

## 2018-10-13 PROCEDURE — 87086 URINE CULTURE/COLONY COUNT: CPT | Performed by: EMERGENCY MEDICINE

## 2018-10-13 PROCEDURE — 80307 DRUG TEST PRSMV CHEM ANLYZR: CPT | Performed by: EMERGENCY MEDICINE

## 2018-10-13 PROCEDURE — 96365 THER/PROPH/DIAG IV INF INIT: CPT

## 2018-10-13 PROCEDURE — 84145 PROCALCITONIN (PCT): CPT | Performed by: INTERNAL MEDICINE

## 2018-10-13 PROCEDURE — 36415 COLL VENOUS BLD VENIPUNCTURE: CPT

## 2018-10-13 PROCEDURE — 96375 TX/PRO/DX INJ NEW DRUG ADDON: CPT

## 2018-10-13 PROCEDURE — 83735 ASSAY OF MAGNESIUM: CPT | Performed by: INTERNAL MEDICINE

## 2018-10-13 PROCEDURE — 82550 ASSAY OF CK (CPK): CPT | Performed by: EMERGENCY MEDICINE

## 2018-10-13 PROCEDURE — 99285 EMERGENCY DEPT VISIT HI MDM: CPT

## 2018-10-13 PROCEDURE — 87040 BLOOD CULTURE FOR BACTERIA: CPT | Performed by: EMERGENCY MEDICINE

## 2018-10-13 PROCEDURE — 82271 OCCULT BLOOD OTHER SOURCES: CPT | Performed by: EMERGENCY MEDICINE

## 2018-10-13 PROCEDURE — 83605 ASSAY OF LACTIC ACID: CPT | Performed by: EMERGENCY MEDICINE

## 2018-10-13 PROCEDURE — 80053 COMPREHEN METABOLIC PANEL: CPT

## 2018-10-13 PROCEDURE — 74174 CTA ABD&PLVS W/CONTRAST: CPT

## 2018-10-13 PROCEDURE — 84484 ASSAY OF TROPONIN QUANT: CPT | Performed by: EMERGENCY MEDICINE

## 2018-10-13 PROCEDURE — 71046 X-RAY EXAM CHEST 2 VIEWS: CPT

## 2018-10-13 PROCEDURE — 99244 OFF/OP CNSLTJ NEW/EST MOD 40: CPT | Performed by: INTERNAL MEDICINE

## 2018-10-13 PROCEDURE — 83605 ASSAY OF LACTIC ACID: CPT

## 2018-10-13 PROCEDURE — 96376 TX/PRO/DX INJ SAME DRUG ADON: CPT

## 2018-10-13 PROCEDURE — 96361 HYDRATE IV INFUSION ADD-ON: CPT

## 2018-10-13 RX ORDER — PSEUDOEPHEDRINE HYDROCHLORIDE 30 MG/1
60 TABLET ORAL EVERY 6 HOURS PRN
Status: DISCONTINUED | OUTPATIENT
Start: 2018-10-13 | End: 2018-10-13 | Stop reason: HOSPADM

## 2018-10-13 RX ORDER — PANTOPRAZOLE SODIUM 40 MG/1
40 INJECTION, POWDER, FOR SOLUTION INTRAVENOUS
Status: DISCONTINUED | OUTPATIENT
Start: 2018-10-13 | End: 2018-10-13 | Stop reason: HOSPADM

## 2018-10-13 RX ORDER — AZITHROMYCIN 250 MG/1
250 TABLET, FILM COATED ORAL EVERY 24 HOURS
Status: DISCONTINUED | OUTPATIENT
Start: 2018-10-13 | End: 2018-10-13 | Stop reason: HOSPADM

## 2018-10-13 RX ORDER — HALOPERIDOL 5 MG/ML
2.5 INJECTION INTRAMUSCULAR ONCE
Status: COMPLETED | OUTPATIENT
Start: 2018-10-13 | End: 2018-10-13

## 2018-10-13 RX ORDER — PANTOPRAZOLE SODIUM 40 MG/1
40 TABLET, DELAYED RELEASE ORAL DAILY
Qty: 30 TABLET | Refills: 0 | Status: SHIPPED | OUTPATIENT
Start: 2018-10-13 | End: 2019-05-04

## 2018-10-13 RX ORDER — HALOPERIDOL 5 MG/ML
2.5 INJECTION INTRAMUSCULAR ONCE
Status: DISCONTINUED | OUTPATIENT
Start: 2018-10-13 | End: 2018-10-13

## 2018-10-13 RX ORDER — FLUTICASONE PROPIONATE 110 UG/1
1 AEROSOL, METERED RESPIRATORY (INHALATION) 2 TIMES DAILY
Status: DISCONTINUED | OUTPATIENT
Start: 2018-10-13 | End: 2018-10-13 | Stop reason: HOSPADM

## 2018-10-13 RX ORDER — ONDANSETRON 2 MG/ML
4 INJECTION INTRAMUSCULAR; INTRAVENOUS ONCE
Status: COMPLETED | OUTPATIENT
Start: 2018-10-13 | End: 2018-10-13

## 2018-10-13 RX ORDER — HEPARIN SODIUM 5000 [USP'U]/ML
5000 INJECTION, SOLUTION INTRAVENOUS; SUBCUTANEOUS EVERY 8 HOURS SCHEDULED
Status: DISCONTINUED | OUTPATIENT
Start: 2018-10-13 | End: 2018-10-13 | Stop reason: HOSPADM

## 2018-10-13 RX ORDER — PREDNISONE 20 MG/1
60 TABLET ORAL DAILY
Status: DISCONTINUED | OUTPATIENT
Start: 2018-10-13 | End: 2018-10-13 | Stop reason: HOSPADM

## 2018-10-13 RX ORDER — GUAIFENESIN 600 MG
1200 TABLET, EXTENDED RELEASE 12 HR ORAL DAILY PRN
COMMUNITY
End: 2019-05-04

## 2018-10-13 RX ORDER — ONDANSETRON 2 MG/ML
4 INJECTION INTRAMUSCULAR; INTRAVENOUS EVERY 4 HOURS PRN
Status: DISCONTINUED | OUTPATIENT
Start: 2018-10-13 | End: 2018-10-13 | Stop reason: HOSPADM

## 2018-10-13 RX ORDER — ACETAMINOPHEN 325 MG/1
650 TABLET ORAL EVERY 6 HOURS PRN
Status: DISCONTINUED | OUTPATIENT
Start: 2018-10-13 | End: 2018-10-13 | Stop reason: HOSPADM

## 2018-10-13 RX ORDER — ALBUTEROL SULFATE 90 UG/1
2 AEROSOL, METERED RESPIRATORY (INHALATION) EVERY 4 HOURS PRN
Status: DISCONTINUED | OUTPATIENT
Start: 2018-10-13 | End: 2018-10-13 | Stop reason: HOSPADM

## 2018-10-13 RX ORDER — NICOTINE 21 MG/24HR
1 PATCH, TRANSDERMAL 24 HOURS TRANSDERMAL DAILY
Status: DISCONTINUED | OUTPATIENT
Start: 2018-10-13 | End: 2018-10-13 | Stop reason: HOSPADM

## 2018-10-13 RX ORDER — POTASSIUM CHLORIDE AND SODIUM CHLORIDE 900; 300 MG/100ML; MG/100ML
125 INJECTION, SOLUTION INTRAVENOUS CONTINUOUS
Status: DISCONTINUED | OUTPATIENT
Start: 2018-10-13 | End: 2018-10-13 | Stop reason: HOSPADM

## 2018-10-13 RX ORDER — HALOPERIDOL 5 MG/ML
5 INJECTION INTRAMUSCULAR ONCE
Status: DISCONTINUED | OUTPATIENT
Start: 2018-10-13 | End: 2018-10-13

## 2018-10-13 RX ADMIN — PANTOPRAZOLE SODIUM 40 MG: 40 INJECTION, POWDER, FOR SOLUTION INTRAVENOUS at 06:23

## 2018-10-13 RX ADMIN — FLUTICASONE PROPIONATE 1 PUFF: 110 AEROSOL, METERED RESPIRATORY (INHALATION) at 09:26

## 2018-10-13 RX ADMIN — AZITHROMYCIN 250 MG: 250 TABLET, FILM COATED ORAL at 06:23

## 2018-10-13 RX ADMIN — HEPARIN SODIUM 5000 UNITS: 5000 INJECTION, SOLUTION INTRAVENOUS; SUBCUTANEOUS at 06:23

## 2018-10-13 RX ADMIN — POTASSIUM CHLORIDE AND SODIUM CHLORIDE 125 ML/HR: 900; 300 INJECTION, SOLUTION INTRAVENOUS at 06:24

## 2018-10-13 RX ADMIN — PREDNISONE 60 MG: 20 TABLET ORAL at 09:26

## 2018-10-13 RX ADMIN — IOHEXOL 100 ML: 350 INJECTION, SOLUTION INTRAVENOUS at 03:59

## 2018-10-13 RX ADMIN — HALOPERIDOL LACTATE 2.5 MG: 5 INJECTION, SOLUTION INTRAMUSCULAR at 03:07

## 2018-10-13 RX ADMIN — ONDANSETRON 4 MG: 2 INJECTION INTRAMUSCULAR; INTRAVENOUS at 04:16

## 2018-10-13 RX ADMIN — PIPERACILLIN SODIUM,TAZOBACTAM SODIUM 3.38 G: 3; .375 INJECTION, POWDER, FOR SOLUTION INTRAVENOUS at 04:06

## 2018-10-13 RX ADMIN — SODIUM CHLORIDE 2500 ML: 0.9 INJECTION, SOLUTION INTRAVENOUS at 04:09

## 2018-10-13 RX ADMIN — POTASSIUM CHLORIDE AND SODIUM CHLORIDE 125 ML/HR: 900; 300 INJECTION, SOLUTION INTRAVENOUS at 14:36

## 2018-10-13 RX ADMIN — ONDANSETRON 4 MG: 2 INJECTION INTRAMUSCULAR; INTRAVENOUS at 02:48

## 2018-10-13 RX ADMIN — HALOPERIDOL LACTATE 2.5 MG: 5 INJECTION, SOLUTION INTRAMUSCULAR at 04:50

## 2018-10-13 RX ADMIN — ONDANSETRON 4 MG: 2 INJECTION INTRAMUSCULAR; INTRAVENOUS at 06:36

## 2018-10-13 RX ADMIN — SODIUM CHLORIDE 1000 ML: 0.9 INJECTION, SOLUTION INTRAVENOUS at 03:04

## 2018-10-13 NOTE — ASSESSMENT & PLAN NOTE
- likely secondary to dehydration/hypovolemia from persistent nausea/vomiting  - continue IV fluid hydration - will trend lactic acid level every two hours until < 2   - initial suspicion of possible infection led to a dose of IV Zosyn being given - check procalcitonin level   - in light of abdominal pain, a CT of abdomen/pelvis ordered is negative for mesenteric ischemia per reading radiologist

## 2018-10-13 NOTE — Clinical Note
Case was discussed with SANDRO and the patient's admission status was agreed to be Admission Status: inpatient status to the service of Dr Charity Posada

## 2018-10-13 NOTE — ASSESSMENT & PLAN NOTE
- likely reactive secondary to dehydration coupled with physiologic stress from abdominal cramping with nausea/vomiting  - remains afebrile - monitor WBC count  - continue IV fluids  - blood cultures ordered in ER - urine culture pending

## 2018-10-13 NOTE — ASSESSMENT & PLAN NOTE
- CTA of abdomen/pelvis negative for acute etiology including mesenteric ischemia per reading radiologist (see plan for lactic acidosis below)  - PRN IV Zofran for emesis control - c/w IV fluids for hydration   - initiate PPI regimen   - will appreciate gastroenterology input  - thoroughly counseled on marijuana cessation   - monitor electrolytes for insensible losses (see plan for hypokalemia below)

## 2018-10-13 NOTE — ASSESSMENT & PLAN NOTE
- thoroughly counseled cessation especially in light of recurrent nausea/vomiting and suspicion for possible cannabinoid hyperemesis syndrome

## 2018-10-13 NOTE — PROGRESS NOTES
Patient 38M admitted overnight for abdominal pain  GI evaluate and his pain was thought to be secondary from gastritis due him being on prednisone and azithromycin  Patient placed on protonix 40mg daily

## 2018-10-13 NOTE — ASSESSMENT & PLAN NOTE
- likely secondary to persistent nausea/vomiting   - replete via additive in IV fluid infusion - monitor level  - check serum magnesium level

## 2018-10-13 NOTE — PLAN OF CARE
DISCHARGE PLANNING     Discharge to home or other facility with appropriate resources Progressing        GASTROINTESTINAL - ADULT     Minimal or absence of nausea and/or vomiting Progressing     Maintains or returns to baseline bowel function Progressing     Maintains adequate nutritional intake Progressing        INFECTION - ADULT     Absence or prevention of progression during hospitalization Progressing        Knowledge Deficit     Patient/family/caregiver demonstrates understanding of disease process, treatment plan, medications, and discharge instructions Progressing        METABOLIC, FLUID AND ELECTROLYTES - ADULT     Electrolytes maintained within normal limits Progressing     Fluid balance maintained Progressing        PAIN - ADULT     Verbalizes/displays adequate comfort level or baseline comfort level Progressing        Potential for Falls     Patient will remain free of falls Progressing        SAFETY ADULT     Patient will remain free of falls Progressing     Maintain or return to baseline ADL function Progressing     Maintain or return mobility status to optimal level Progressing

## 2018-10-13 NOTE — PLAN OF CARE
DISCHARGE PLANNING     Discharge to home or other facility with appropriate resources Progressing        GASTROINTESTINAL - ADULT     Minimal or absence of nausea and/or vomiting Progressing     Maintains or returns to baseline bowel function Progressing     Maintains adequate nutritional intake Progressing        INFECTION - ADULT     Absence or prevention of progression during hospitalization Progressing        Knowledge Deficit     Patient/family/caregiver demonstrates understanding of disease process, treatment plan, medications, and discharge instructions Progressing        METABOLIC, FLUID AND ELECTROLYTES - ADULT     Electrolytes maintained within normal limits Progressing     Fluid balance maintained Progressing        PAIN - ADULT     Verbalizes/displays adequate comfort level or baseline comfort level Progressing        SAFETY ADULT     Patient will remain free of falls Progressing     Maintain or return to baseline ADL function Progressing     Maintain or return mobility status to optimal level Progressing

## 2018-10-13 NOTE — UTILIZATION REVIEW
Initial Clinical Review    Admission: Date/Time/Statement: 10/13/18 @ 0504 INPATIENT    Orders Placed This Encounter   Procedures    Inpatient Admission (expected length of stay for this patient is greater than two midnights)     Standing Status:   Standing     Number of Occurrences:   1     Order Specific Question:   Admitting Physician     Answer:   Ken Montilla     Order Specific Question:   Level of Care     Answer:   Med Surg [16]     Order Specific Question:   Estimated length of stay     Answer:   More than 2 Midnights     Order Specific Question:   Certification     Answer:   I certify that inpatient services are medically necessary for this patient for a duration of greater than two midnights  See H&P and MD Progress Notes for additional information about the patient's course of treatment  ED: Date/Time/Mode of Arrival:   ED Arrival Information     Expected Arrival Acuity Means of Arrival Escorted By Service Admission Type    - 10/13/2018 02:35 Urgent Wheelchair Family Member Hospitalist Urgent    Arrival Complaint    stomach pain        Chief Complaint:   Chief Complaint   Patient presents with    Abdominal Pain     Seen here yesterday for URI, incoherent at home, states he is "in so much pain, no opiates " Pt states pain radiates to b/l flanks  History of Illness:     Eliot Essex is a 41 y o  male who presents with complaints of sudden-onset abdominal cramping with intractable nausea/vomiting which woke him up in the middle of the night in a manic state as described by his girlfriend to the ER staff    He was recently seen in the ER a few days back due to cough/congestion deemed to be tracheobronchitis for which he was discharged on a short course of Zithromax along with Prednisone and Sudafed for symptomatic relief         ED Vital Signs:   ED Triage Vitals   Temperature Pulse Respirations Blood Pressure SpO2   10/13/18 0242 10/13/18 0240 10/13/18 0240 10/13/18 0240 10/13/18 0240 98 3 °F (36 8 °C) 75 22 122/56 92 %   Worst Possible Pain        Wt Readings from Last 1 Encounters:   10/13/18 102 kg (224 lb 13 9 oz)       Vital Signs: WNL    Abnormal Labs/Diagnostic Test Results:     Lactic acid = 5 0, 2 7, WBC = 12 46, ANC = 8 68, Potassium = 3 2, Sodium = 135    Urine tox screen: Positive for THC  CT abdomen and pelvis: no evidence of mesenteric ischemia      ED Treatment:   Medication Administration from 10/13/2018 0234 to 10/13/2018 4825       Date/Time Order Dose Route Action     10/13/2018 0248 ondansetron (ZOFRAN) injection 4 mg 4 mg Intravenous Given     10/13/2018 0304 sodium chloride 0 9 % bolus 1,000 mL 1,000 mL Intravenous New Bag     10/13/2018 0307 haloperidol lactate (HALDOL) injection 2 5 mg 2 5 mg Intravenous Given     10/13/2018 0406 piperacillin-tazobactam (ZOSYN) 3 375 g in sodium chloride 0 9 % 50 mL IVPB 3 375 g Intravenous New Bag     10/13/2018 0409 sodium chloride 0 9 % bolus 2,500 mL 2,500 mL Intravenous New Bag     10/13/2018 0359 iohexol (OMNIPAQUE) 350 MG/ML injection (SINGLE-DOSE) 100 mL 100 mL Intravenous Given     10/13/2018 0416 ondansetron (ZOFRAN) injection 4 mg 4 mg Intravenous Given     10/13/2018 0450 haloperidol lactate (HALDOL) injection 2 5 mg 2 5 mg Intravenous Given        Past Medical/Surgical History:     Past Medical History:   Diagnosis Date    Asthma     Chronic pain      Admitting Diagnosis: Lactic acidosis [E87 2]  Abdominal pain [R10 9]  Intractable nausea and vomiting [R11 2]  Abdominal pain, unspecified abdominal location [R10 9]    Age/Sex: 41 y o  male    Assessment/Plan:     Abdominal pain - Recurrent nausea with vomiting possibly Cannabinoid hyperemesis syndrome    Assessment & Plan     - CTA of abdomen/pelvis negative for acute etiology including mesenteric ischemia per reading radiologist (see plan for lactic acidosis below)  - PRN IV Zofran for emesis control - c/w IV fluids for hydration   - initiate PPI regimen   - will appreciate gastroenterology input  - thoroughly counseled on marijuana cessation   - monitor electrolytes for insensible losses (see plan for hypokalemia below)       Lactic acidosis   Assessment & Plan     - likely secondary to dehydration/hypovolemia from persistent nausea/vomiting  - continue IV fluid hydration - will trend lactic acid level every two hours until < 2   - initial suspicion of possible infection led to a dose of IV Zosyn being given - check procalcitonin level   - in light of abdominal pain, a CT of abdomen/pelvis ordered is negative for mesenteric ischemia per reading radiologist      Leukocytosis   Assessment & Plan     - likely reactive secondary to dehydration coupled with physiologic stress from abdominal cramping with nausea/vomiting  - remains afebrile - monitor WBC count  - continue IV fluids  - blood cultures ordered in ER - urine culture pending       Hypokalemia   Assessment & Plan     - likely secondary to persistent nausea/vomiting   - replete via additive in IV fluid infusion - monitor level  - check serum magnesium level      Marijuana abuse   Assessment & Plan     - thoroughly counseled cessation especially in light of recurrent nausea/vomiting and suspicion for possible cannabinoid hyperemesis syndrome      Recent tracheobronchitis - history of Asthma    Assessment & Plan     - visited the ER a few days ago and was discharged with prescription for steroid taper along with Sudafed for symptomatic relief and a short course of Zithromax (to be continued through 10/15)   - CXR was negative for acute cardiopulmonary disease   - PRN Albuterol inhaler on board   - tobacco smoking cessation encouraged         Admission Orders: Gastroenterology consult, sequential compression device, NPO, blood cultures, urine culture, procalcitonin, telemetry monitoring, activity as tolerated      Scheduled Meds:   Current Facility-Administered Medications:  acetaminophen 650 mg Oral Q6H PRN   albuterol 2 puff Inhalation Q4H PRN   azithromycin 250 mg Oral Q24H   fluticasone 1 puff Inhalation BID   heparin (porcine) 5,000 Units Subcutaneous Q8H [address]   nicotine 1 patch Transdermal Daily   ondansetron 4 mg Intravenous Q4H PRN   pantoprazole 40 mg Intravenous Q24H EMELY   predniSONE 60 mg Oral Daily   pseudoephedrine 60 mg Oral Q6H PRN     Continuous Infusions:   sodium chloride 0 9 % with KCl 40 mEq/L 125 mL/hr Last Rate: 125 mL/hr (10/13/18 1436)     ==================================================================  10/13/18 Gastroenterology Consult:    Generalized Abdominal Pain  Nausea/Vomiting  - CTA A/P on admission negative for acute etiology or mesenteric ischemic  - Lactic acid elevated on admission at 5 0 with a mild leukcytosis  - Suspect his symptoms are 2/2 viral gastroenteritis v gastritis/GI intolerance to recently prescribed zpak and prednisone course  - Continue supportive care with IVF  - Trial clear liquids today  - Continue protonix 40 mg IV daily, can transition to PO if he tolerates and complete 1-2 months course  - Can consider EGD as outpatient if his symptoms are persistent          Thank you,  Ericka Alcantar Utilization Review Department  Phone: [phone]; Fax [phone]  ATTENTION: Please call with any questions or concerns to [phone]  and carefully follow the prompts so that you are directed to the right person  Send all requests for admission clinical reviews, approved or denied determinations and any other requests to fax [phone]   All voicemails are confidential

## 2018-10-13 NOTE — H&P
History & Physical - [facility] Hospitalist Service - Internal Medicine        PATIENT INFORMATION      Aaron Garza 41 y o  male MRN: [id number]  Unit/Bed#: ED 32 Encounter: [id number]  Admitting Physician: Eitan Mg MD  PCP: No primary care provider on file    Date of Admission:  10/13/18      ASSESSMENTS & PLAN       Abdominal pain - Recurrent nausea with vomiting possibly Cannabinoid hyperemesis syndrome    Assessment & Plan    - CTA of abdomen/pelvis negative for acute etiology including mesenteric ischemia per reading radiologist (see plan for lactic acidosis below)  - PRN IV Zofran for emesis control - c/w IV fluids for hydration   - initiate PPI regimen   - will appreciate gastroenterology input  - thoroughly counseled on marijuana cessation   - monitor electrolytes for insensible losses (see plan for hypokalemia below)      Lactic acidosis   Assessment & Plan    - likely secondary to dehydration/hypovolemia from persistent nausea/vomiting  - continue IV fluid hydration - will trend lactic acid level every two hours until < 2   - initial suspicion of possible infection led to a dose of IV Zosyn being given - check procalcitonin level   - in light of abdominal pain, a CT of abdomen/pelvis ordered is negative for mesenteric ischemia per reading radiologist     Leukocytosis   Assessment & Plan    - likely reactive secondary to dehydration coupled with physiologic stress from abdominal cramping with nausea/vomiting  - remains afebrile - monitor WBC count  - continue IV fluids  - blood cultures ordered in ER - urine culture pending      Hypokalemia   Assessment & Plan    - likely secondary to persistent nausea/vomiting   - replete via additive in IV fluid infusion - monitor level  - check serum magnesium level     Marijuana abuse   Assessment & Plan    - thoroughly counseled cessation especially in light of recurrent nausea/vomiting and suspicion for possible cannabinoid hyperemesis syndrome Tobacco abuse   Assessment & Plan    - transdermal nicotine patch on board  - cessation counseling     Recent tracheobronchitis - history of Asthma    Assessment & Plan    - visited the ER a few days ago and was discharged with prescription for steroid taper along with Sudafed for symptomatic relief and a short course of Zithromax (to be continued through 10/15)   - CXR was negative for acute cardiopulmonary disease   - PRN Albuterol inhaler on board   - tobacco smoking cessation encouraged        DVT Prophylaxis:  Heparin SC      CHIEF COMPLAINT      Abdominal cramping with nausea/vomiting overnight       HISTORY OF PRESENT ILLNESS      Annel Nina is a 41 y o  male who presents with complaints of sudden-onset abdominal cramping with intractable nausea/vomiting which woke him up in the middle of the night in a manic state as described by his girlfriend to the ER staff  He was recently seen in the ER a few days back due to cough/congestion deemed to be tracheobronchitis for which he was discharged on a short course of Zithromax along with Prednisone and Sudafed for symptomatic relief  He notes he has had similar types of abdominal discomfort in the past notably when he once had Norovirus although does not this severe  He denies any blood in his vomit  He denies any recent changes in his bowel movements, changes in his diet, or recent travel  In the ER, routine blood work revealed a markedly elevated lactic acid level of 5 0 which prompted a CT angiogram of the abdomen/pelvis that was fortunately negative for mesenteric ischemia and in fact any acute etiology  He was initiated on aggressive IV fluids for hydration  Upon my encounter, he does react knowledge daily marijuana use sometimes smoking 2-3 times per day  Due to his persistent agitation in the ER, he was given doses of Haldol which is calmed him down by the time of my encounter    He still notes some abdominal cramping but states the tenderness has improved  He also notes his nausea is slowly improved as well  No other complaints at this time  REVIEW OF SYSTEMS      Review of Systems - A thorough 12 point review systems was conducted  Pertinent positives and negatives are mentioned in the history of present illness  PAST MEDICAL & SURGICAL HISTORY      Past Medical History:   Diagnosis Date    Asthma     Chronic pain     back pain       History reviewed  No pertinent surgical history  MEDICATIONS & ALLERGIES       Prior to Admission medications    Medication Sig Start Date End Date Taking? Authorizing Provider   albuterol (PROVENTIL HFA,VENTOLIN HFA) 90 mcg/act inhaler Inhale 2 puffs every 4 (four) hours as needed for wheezing 10/31/17   Jeni Johnson MD   azithromycin [facility]) 250 mg tablet Take 2 tablets today then 1 tablet daily x 4 days 10/11/18 10/15/18  Stepan Beaver DO   fluticasone (FLOVENT HFA) 110 MCG/ACT inhaler Inhale 1 puff 2 (two) times a day 10/31/17   Jeni Johnson MD   lidocaine (LIDODERM) 5 % Place 1 patch on the skin every 24 hours for 30 days Remove & Discard patch within 12 hours or as directed by MD 12/12/16 1/11/17  Tanmay Johnson MD   methocarbamol (ROBAXIN) 500 mg tablet Take 1 tablet by mouth 3 (three) times a day as needed for muscle spasms for up to 30 days Prn muscle spasms 12/12/16 1/11/17  Tanmay Johnson MD   predniSONE 20 mg tablet Take 3 tablets (60 mg total) by mouth daily for 4 days 10/12/18 10/16/18  Collin Wesley DO   pseudoephedrine (SUDAFED) 60 mg tablet Take 1 tablet (60 mg total) by mouth every 6 (six) hours as needed for congestion 10/11/18   Stepan Beaver DO         Allergies:    Allergies   Allergen Reactions    Advil [Ibuprofen] Hives         SOCIAL HISTORY        Occupation:    Patient Pre-hospital Living Situation:  Resides at home  Patient Pre-hospital Level of Mobility:  Ambulatory      Substance Use History:   History   Alcohol Use No     History   Smoking Status  Current Every Day Smoker    Packs/day: 0 25   Smokeless Tobacco    Never Used     History   Drug Use    Frequency: 1 0 time per week    Types: Marijuana         FAMILY HISTORY      Significant for thyroid cancer in mother  Significant for emphysema in father  PHYSICAL EXAM      Vitals:   Blood Pressure: 132/69 (10/13/18 0441)  Pulse: 61 (10/13/18 0441)  Temperature: 98 2 °F (36 8 °C) (10/13/18 0441)  Temp Source: Oral (10/13/18 0441)  Respirations: 15 (10/13/18 0441)  Weight - Scale: 102 kg (224 lb 13 9 oz) (10/13/18 0240)  SpO2: 100 % (10/13/18 0441)      GENERAL:  Well-developed/nourished - intermittent distress due to abdominal discomfort  HEAD:  Normocephalic - atraumatic  EYES: PERRL - EOMI   MOUTH:  Mucosa dry  NECK:  Supple - full range of motion  CARDIAC:  Regular rate/rhythm - S1/S2 positive  PULMONARY:  Clear breath sounds bilaterally - nonlabored respirations  ABDOMEN:  Soft - nonspecific tenderness - nondistended - active bowel sounds  MUSCULOSKELETAL:  Motor strength/range of motion intact  NEUROLOGIC:  Alert/oriented x 3 - weak/fatigued however  SKIN:  tattoos noted - chronic wrinkles/blemishes otherwise  PSYCHIATRIC:  Mood/affect anxious      ADDITIONAL DATA     Lab Results:       Results from last 7 days  Lab Units 10/13/18  0248   WBC Thousand/uL 12 46*   HEMOGLOBIN g/dL 14 8   HEMATOCRIT % 44 4   PLATELETS Thousands/uL 324   NEUTROS PCT % 71   LYMPHS PCT % 21   MONOS PCT % 8   EOS PCT % 0       Results from last 7 days  Lab Units 10/13/18  0248   SODIUM mmol/L 135*   POTASSIUM mmol/L 3 2*   CHLORIDE mmol/L 99*   CO2 mmol/L 24   BUN mg/dL 9   CREATININE mg/dL 1 15   CALCIUM mg/dL 9 9   ALK PHOS U/L 75   ALT U/L 29   AST U/L 14           Imaging:     Xr Chest Pa & Lateral    Result Date: 10/13/2018  Narrative: CHEST INDICATION:   abdominal pain, vomiting   COMPARISON:  Two-view chest 10/11/2018 EXAM PERFORMED/VIEWS:  XR CHEST PA & LATERAL  The frontal view was performed utilizing dual energy radiographic technique  FINDINGS: Cardiomediastinal silhouette appears unremarkable  No airspace consolidation, pneumothorax, pulmonary edema, or pleural effusion  Mild thoracic spondylosis  Impression: No radiographic evidence of acute intrathoracic process or significant interval change  Workstation performed: [id number]     X-ray Chest 2 Views    Result Date: 10/11/2018  Narrative: CHEST INDICATION:   cough, chest tightness, fever  COMPARISON:  None EXAM PERFORMED/VIEWS:  XR CHEST PA & LATERAL FINDINGS: Cardiomediastinal silhouette appears unremarkable  The lungs are clear  No pneumothorax or pleural effusion  Osseous structures appear within normal limits for patient age  Impression: No acute cardiopulmonary disease  Workstation performed: [id number]     Cta Abdomen Pelvis W Wo Contrast    Result Date: 10/13/2018  Narrative: CT ANGIOGRAM OF THE ABDOMEN AND PELVIS WITH AND WITHOUT IV CONTRAST INDICATION:  Abdominal pain COMPARISON: None  TECHNIQUE:  CT angiogram examination of the abdomen and pelvis was performed according to standard protocol  This examination, like all CT scans performed in the [facility], was performed utilizing techniques to minimize radiation dose exposure, including the use of iterative reconstruction and automated exposure control  Contrast as well as noncontrast images were obtained  Rad dose 2075 73 mGy-cm IV Contrast:  100 mL of iohexol (OMNIPAQUE) Enteric Contrast:  Not administered FINDINGS: VASCULAR STRUCTURES:  The abdominal aorta, bilateral common, internal and external iliac arteries are normal in course and caliber  The celiac artery, SMA and SHAYNA are normal in course and caliber  There is early division of the right renal artery  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 OTHER FINDINGS ABDOMEN LOWER CHEST:  No significant abnormality in the lung bases  LIVER/BILIARY TREE:  Unremarkable  GALLBLADDER:  No calcified gallstones   No pericholecystic inflammatory change  SPLEEN:  Unremarkable  Normal size  PANCREAS:  Unremarkable  ADRENAL GLANDS:  Unremarkable  KIDNEYS/URETERS:  No solid renal mass  No hydronephrosis  No urinary tract calculi  PELVIS REPRODUCTIVE ORGANS:  Unremarkable for patient's age  URINARY BLADDER:  Unremarkable  ADDITIONAL ABDOMINAL AND PELVIC STRUCTURES STOMACH AND BOWEL:  Unremarkable  ABDOMINOPELVIC CAVITY:  No pathologically enlarged mesenteric or retroperitoneal lymph nodes  No ascites or free intraperitoneal air  ABDOMINAL WALL/INGUINAL REGIONS:  Unremarkable  OSSEOUS STRUCTURES:  No acute fracture or destructive osseous lesion  Impression: No evidence of mesenteric ischemia  Unremarkable CT angiogram of the abdomen and pelvis  Workstation performed: [id number]       Code Status:  Full code      Anticipated Length of Stay:  Patient will be admitted on an Inpatient basis with an anticipated length of stay of  greater than 2 midnights  Justification for Hospital Stay:  Abdominal discomfort with intractable nausea/vomiting and lactic acidosis requiring IV fluid hydration and gastroenterology evaluation  Total Time for Visit, including Counseling / Coordination of Care: 72 minutes  Greater than 50% of this total time spent on direct patient counseling and coordination of care     ** Please Note: This note is constructed using a voice recognition dictation system   **

## 2018-10-13 NOTE — ED NOTES
Pt repeatedly expresses discomfort, states "it hurts so bad, just knock me out  Please just knock me out " Pt reassured that provider is aware of pts pain level  Pt continues to act manic and anxious       Mee Morales RN  10/13/18 9445

## 2018-10-13 NOTE — CONSULTS
Consultation - [facility]  Corinagrazyna Melanie 41 y o  male MRN: [id number]  Unit/Bed#: -01 Encounter: [id number]        Consults    Reason for Consult / Principal Problem: Nausea, Vomiting, Abdominal Pain    HPI: Mr Violeta Mast is a 42 yo M with no significant PMH, who presented last night after acute onset of generalized abdominal pain associated with nausea and nonbloody vomiting  He was seen in the ER a few days ago and given a zpak and a short course of prednisone 60 mg x 4 days  He denies any recent travel or sick contacts  He denies any melena or hematochezia  He did have soft stool but denies any diarrhea  He denies chronic NSAID use  He has never had an EGD and denies any history of PUD  Currently his symptoms have mostly resolved and he denies nausea or vomiting overnight  He has mild abdominal discomfort  He would like to try liquids  REVIEW OF SYSTEMS: Negative except for as stated above    Historical Information   Past Medical History:   Diagnosis Date    Asthma     Chronic pain     back pain     History reviewed  No pertinent surgical history  Social History   History   Alcohol Use No     History   Drug Use    Frequency: 1 0 time per week    Types: Marijuana     History   Smoking Status    Current Every Day Smoker    Packs/day: 0 25   Smokeless Tobacco    Never Used     History reviewed  No pertinent family history      Meds/Allergies     Prescriptions Prior to Admission   Medication    guaiFENesin (MUCINEX) 600 mg 12 hr tablet    albuterol (PROVENTIL HFA,VENTOLIN HFA) 90 mcg/act inhaler    azithromycin (ZITHROMAX) 250 mg tablet    fluticasone (FLOVENT HFA) 110 MCG/ACT inhaler    predniSONE 20 mg tablet     Current Facility-Administered Medications   Medication Dose Route Frequency    acetaminophen (TYLENOL) tablet 650 mg  650 mg Oral Q6H PRN    albuterol (PROVENTIL HFA,VENTOLIN HFA) inhaler 2 puff  2 puff Inhalation Q4H PRN    azithromycin (ZITHROMAX) tablet 250 mg 250 mg Oral Q24H    fluticasone (FLOVENT HFA) 110 MCG/ACT inhaler 1 puff  1 puff Inhalation BID    heparin (porcine) subcutaneous injection 5,000 Units  5,000 Units Subcutaneous Q8H [address]    nicotine (NICODERM CQ) 14 mg/24hr TD 24 hr patch 1 patch  1 patch Transdermal Daily    ondansetron (ZOFRAN) injection 4 mg  4 mg Intravenous Q4H PRN    pantoprazole (PROTONIX) injection 40 mg  40 mg Intravenous Q24H EMELY    predniSONE tablet 60 mg  60 mg Oral Daily    pseudoephedrine (SUDAFED) tablet 60 mg  60 mg Oral Q6H PRN    sodium chloride 0 9 % with KCl 40 mEq/L infusion (premix)  125 mL/hr Intravenous Continuous       Allergies   Allergen Reactions    Advil [Ibuprofen] Hives           Objective     Blood pressure 135/75, pulse (!) 50, temperature 98 2 °F (36 8 °C), temperature source Oral, resp  rate 16, height 6' 2" (1 88 m), weight 102 kg (224 lb 13 9 oz), SpO2 100 %      No intake or output data in the 24 hours ending 10/13/18 1014      PHYSICAL EXAM:      General Appearance:   Alert, cooperative, no distress, appears stated age    HEENT:   Normocephalic, atraumatic, anicteric      Neck:  Supple, symmetrical, trachea midline   Lungs:   Clear to auscultation bilaterally, no respiratory distress   Heart[de-identified]   RRR, no murmur   Abdomen:   Non-distended, soft, BS active, NTTP   Rectal:   Deferred    Extremities:  No cyanosis, clubbing or edema    Pulses:  2+ and symmetric all extremities    Skin:  No jaundice or pallor     Lab Results:     Results from last 7 days  Lab Units 10/13/18  0654 10/13/18  0248   WBC Thousand/uL  --  12 46*   HEMOGLOBIN g/dL  --  14 8   HEMATOCRIT %  --  44 4   PLATELETS Thousands/uL 234 324   NEUTROS PCT %  --  71   LYMPHS PCT %  --  21   MONOS PCT %  --  8   EOS PCT %  --  0       Results from last 7 days  Lab Units 10/13/18  0248   SODIUM mmol/L 135*   POTASSIUM mmol/L 3 2*   CHLORIDE mmol/L 99*   CO2 mmol/L 24   BUN mg/dL 9   CREATININE mg/dL 1 15   CALCIUM mg/dL 9 9   ALK PHOS U/L 75   ALT U/L 29   AST U/L 14           Results from last 7 days  Lab Units 10/13/18  0248   LIPASE u/L 128       Imaging Studies: I have personally reviewed pertinent imaging studies  Xr Chest Pa & Lateral  Result Date: 10/13/2018  Impression: No radiographic evidence of acute intrathoracic process or significant interval change  Cta Abdomen Pelvis W Wo Contrast  Result Date: 10/13/2018  Impression: No evidence of mesenteric ischemia  Unremarkable CT angiogram of the abdomen and pelvis  ASSESSMENT and PLAN:      Generalized Abdominal Pain  Nausea/Vomiting  - CTA A/P on admission negative for acute etiology or mesenteric ischemic  - Lactic acid elevated on admission at 5 0 with a mild leukcytosis  - Suspect his symptoms are 2/2 viral gastroenteritis v gastritis/GI intolerance to recently prescribed zpak and prednisone course  - Continue supportive care with IVF  - Trial clear liquids today  - Continue protonix 40 mg IV daily, can transition to PO if he tolerates and complete 1-2 months course  - Can consider EGD as outpatient if his symptoms are persistent      Patient will be seen and examined by Dr Héctor Rodriguez  All carrizales medical decisions were made by Dr Héctor Rodriguez  Thank you for allowing us to participate in the care of this patient  We will follow with you closely

## 2018-10-13 NOTE — ED PROVIDER NOTES
History  Chief Complaint   Patient presents with    Abdominal Pain     Seen here yesterday for URI, incoherent at home, states he is "in so much pain, no opiates " Pt states pain radiates to b/l flanks  HPI     68-year-old male with history of asthma, presenting to the ED with severe abdominal pain radiating to the bilateral flanks  Pain started about an hour ago  Patient states he had numerous episodes of nonbloody nonbilious emesis over the last couple of hours, with subsequent development of severe abdominal pain that feels like my guts are ripping and burning inside me    Patient's fiancee states that he was incoherent at home   When asked what she means by this, she tells me that the patient woke her up in the middle the night, was tremulous, crying in pain, and speaking really fast like he was manic   Patient does not have a history of isabella, but does have a history of depression and anxiety  Following development of the patient's severe abdominal pain, he felt like he had to have a bowel movement  Produced 1 small light brown stool, and then reports feeling lightheaded like he was going to pass out  He did not actually lose consciousness  Continues to report intermittent lightheadedness  Patient tells me that he has had abdominal pain like this before, but never this severe  No history of abdominal surgeries or kidney stones  Endorses dysuria for the last day, but no frequency or hematuria  No diarrhea, blood in his stool, or black tarry stools  Continues to endorse nausea  Denies chest pain or shortness of breath  Denies fevers or chills  No aggravating or alleviating factors for his abdominal pain  Patient admits to smoking marijuana 2-3 times daily for years  Denies history of hyperemesis  Denies additional drug use  No alcohol use  Of note, patient was evaluated in the emergency department yesterday for nasal congestion cough, and chest tightness    At that time he reported fevers over the last 2 days with T-max of a 102°  He denies fevers over the last 2 days  During that emergency department visit a chest x-ray was obtained that was unremarkable  Patient was given a breathing treatment with improvement in his symptoms, and discharged home with Z-Renzo and prednisone burst     Prior to Admission Medications   Prescriptions Last Dose Informant Patient Reported? Taking? albuterol (PROVENTIL HFA,VENTOLIN HFA) 90 mcg/act inhaler   No No   Sig: Inhale 2 puffs every 4 (four) hours as needed for wheezing   azithromycin (ZITHROMAX) 250 mg tablet   No No   Sig: Take 2 tablets today then 1 tablet daily x 4 days   fluticasone (FLOVENT HFA) 110 MCG/ACT inhaler   No No   Sig: Inhale 1 puff 2 (two) times a day   guaiFENesin (MUCINEX) 600 mg 12 hr tablet  Self Yes Yes   Sig: Take 1,200 mg by mouth daily as needed for cough   predniSONE 20 mg tablet   No No   Sig: Take 3 tablets (60 mg total) by mouth daily for 4 days      Facility-Administered Medications: None       Past Medical History:   Diagnosis Date    Asthma     Chronic pain     back pain       History reviewed  No pertinent surgical history  History reviewed  No pertinent family history  I have reviewed and agree with the history as documented  Social History   Substance Use Topics    Smoking status: Current Every Day Smoker     Packs/day: 0 25    Smokeless tobacco: Never Used    Alcohol use No        Review of Systems   Constitutional: Negative for chills and fever  HENT: Negative for congestion  Eyes: Negative for visual disturbance  Respiratory: Negative for cough and shortness of breath  Cardiovascular: Negative for chest pain and leg swelling  Gastrointestinal: Positive for abdominal pain, nausea and vomiting  Negative for blood in stool and diarrhea  Genitourinary: Negative for dysuria, frequency, scrotal swelling and testicular pain     Musculoskeletal: Negative for arthralgias, back pain, neck pain and neck stiffness  Skin: Negative for rash  Neurological: Negative for weakness, numbness and headaches  Psychiatric/Behavioral: Positive for agitation  Negative for confusion  Physical Exam  Physical Exam   Constitutional: He is oriented to person, place, and time  He appears well-developed and well-nourished  He appears distressed (agitated, appears uncomfortable)  HENT:   Head: Normocephalic and atraumatic  Right Ear: External ear normal    Left Ear: External ear normal    Nose: Nose normal    Mouth/Throat: Oropharynx is clear and moist    Eyes: Conjunctivae are normal    Neck: Normal range of motion  Neck supple  Cardiovascular: Normal rate, regular rhythm and normal heart sounds  Exam reveals no gallop and no friction rub  No murmur heard  Pulmonary/Chest: Effort normal and breath sounds normal  No respiratory distress  He has no wheezes  He has no rales  Abdominal: Soft  Bowel sounds are normal  He exhibits no distension  There is tenderness (mild generalized TTP, worse over the left flank, overall benign despite pt writhing in pain)  There is no guarding  Musculoskeletal: Normal range of motion  He exhibits no edema, tenderness (No CVA tenderness) or deformity  Neurological: He is alert and oriented to person, place, and time  He exhibits normal muscle tone  Skin: Skin is warm and dry  He is not diaphoretic     Psychiatric:   Agitated, tearful, pressured speech, but pt is redirectable and amenable to verbal de-escalation       Vital Signs  ED Triage Vitals   Temperature Pulse Respirations Blood Pressure SpO2   10/13/18 0242 10/13/18 0240 10/13/18 0240 10/13/18 0240 10/13/18 0240   98 3 °F (36 8 °C) 75 22 122/56 92 %      Temp Source Heart Rate Source Patient Position - Orthostatic VS BP Location FiO2 (%)   10/13/18 0242 10/13/18 0240 10/13/18 0240 10/13/18 0240 --   Oral Monitor Lying Right arm       Pain Score       10/13/18 0240       Worst Possible Pain           Vitals: 10/13/18 0441 10/13/18 0700 10/13/18 1100 10/13/18 1500   BP: 132/69 135/75 129/74 135/70   Pulse: 61 (!) 50 56 58   Patient Position - Orthostatic VS: Lying Lying Lying Lying       Visual Acuity      ED Medications  Medications   ondansetron (ZOFRAN) injection 4 mg (4 mg Intravenous Given 10/13/18 0248)   sodium chloride 0 9 % bolus 1,000 mL (0 mL Intravenous Stopped 10/13/18 0441)   haloperidol lactate (HALDOL) injection 2 5 mg (2 5 mg Intravenous Given 10/13/18 0307)   piperacillin-tazobactam (ZOSYN) 3 375 g in sodium chloride 0 9 % 50 mL IVPB (0 g Intravenous Stopped 10/13/18 0436)   sodium chloride 0 9 % bolus 2,500 mL (0 mL Intravenous Stopped 10/13/18 1436)   iohexol (OMNIPAQUE) 350 MG/ML injection (SINGLE-DOSE) 100 mL (100 mL Intravenous Given 10/13/18 0359)   ondansetron (ZOFRAN) injection 4 mg (4 mg Intravenous Given 10/13/18 0416)   haloperidol lactate (HALDOL) injection 2 5 mg (2 5 mg Intravenous Given 10/13/18 0450)       Diagnostic Studies  Results Reviewed     Procedure Component Value Units Date/Time    Blood culture #1 [[id number]] Collected:  10/13/18 0338    Lab Status:  Preliminary result Specimen:  Blood from Arm, Left Updated:  10/14/18 1201     Blood Culture No Growth at 24 hrs  Blood culture #2 [[id number]] Collected:  10/13/18 0338    Lab Status:  Preliminary result Specimen:  Blood from Hand, Right Updated:  10/14/18 1201     Blood Culture No Growth at 24 hrs      Urine culture [[id number]] Collected:  10/13/18 1025    Lab Status:  Final result Specimen:  Urine from Urine, Other Updated:  10/14/18 0847     Urine Culture No Growth <1000 cfu/mL    Troponin I [[id number]]  (Normal) Collected:  10/13/18 0654    Lab Status:  Final result Specimen:  Blood from Arm, Right Updated:  10/13/18 0738     Troponin I <0 02 ng/mL     Magnesium [[id number]]  (Normal) Collected:  10/13/18 0248    Lab Status:  Final result Specimen:  Blood from Arm, Right Updated:  10/13/18 0653     Magnesium 1 8 mg/dL     CK (with reflex to MB) [[id number]]  (Normal) Collected:  10/13/18 0248    Lab Status:  Final result Specimen:  Blood from Arm, Right Updated:  10/13/18 0543     Total  U/L     Lactic acid, plasma [[id number]]  (Abnormal) Collected:  10/13/18 0446    Lab Status:  Final result Specimen:  Blood from Arm, Left Updated:  10/13/18 0513     LACTIC ACID 2 7 (HH) mmol/L     Narrative:         Result may be elevated if tourniquet was used during collection  Troponin I [[id number]]  (Normal) Collected:  10/13/18 0337    Lab Status:  Final result Specimen:  Blood Updated:  10/13/18 0351     Troponin I <0 02 ng/mL     Ethanol [[id number]]  (Normal) Collected:  10/13/18 0306    Lab Status:  Final result Specimen:  Blood from Arm, Right Updated:  10/13/18 0348     Ethanol Lvl <3 mg/dL     Rapid drug screen, urine [[id number]]  (Abnormal) Collected:  10/13/18 0321    Lab Status:  Final result Specimen:  Urine from Urine, Clean Catch Updated:  10/13/18 0340     Amph/Meth UR Negative     Barbiturate Ur Negative     Benzodiazepine Urine Negative     Cocaine Urine Negative     Methadone Urine Negative     Opiate Urine Negative     PCP Ur Negative     THC Urine Positive (A)    Narrative:         Presumptive report  If requested, specimen will be sent to reference lab for confirmation  FOR MEDICAL PURPOSES ONLY  IF CONFIRMATION NEEDED PLEASE CONTACT THE LAB WITHIN 5 DAYS      Drug Screen Cutoff Levels:  AMPHETAMINE/METHAMPHETAMINES  1000 ng/mL  BARBITURATES     200 ng/mL  BENZODIAZEPINES     200 ng/mL  COCAINE      300 ng/mL  METHADONE      300 ng/mL  OPIATES      300 ng/mL  PHENCYCLIDINE     25 ng/mL  THC       50 ng/mL    Urinalysis with microscopic [[id number]]  (Abnormal) Collected:  10/13/18 0321    Lab Status:  Final result Specimen:  Urine from Urine, Clean Catch Updated:  10/13/18 0340     Clarity, UA Clear     Color, UA Yellow     Specific Gravity, UA 1 020     pH, UA 8 5 (H)     Glucose, UA Negative mg/dl      Ketones, UA Negative mg/dl      Blood, UA Negative     Protein, UA Negative mg/dl      Nitrite, UA Negative     Bilirubin, UA Negative     Urobilinogen, UA 0 2 E U /dl      Leukocytes, UA Negative     WBC, UA None Seen /hpf      RBC, UA None Seen /hpf      Bacteria, UA None Seen /hpf      Epithelial Cells None Seen /hpf     Occult blood gastric / duodenum [[id number]]  (Normal) Collected:  10/13/18 0319    Lab Status:  Final result Specimen: Body Fluid from Stomach Updated:  10/13/18 0331     Occult Blood, Gastric Negative    Lactic acid, plasma [[id number]]  (Abnormal) Collected:  10/13/18 0248    Lab Status:  Final result Specimen:  Blood from Arm, Right Updated:  10/13/18 0323     LACTIC ACID 5 0 (HH) mmol/L     Narrative:         Result may be elevated if tourniquet was used during collection  Lipase [[id number]]  (Normal) Collected:  10/13/18 0248    Lab Status:  Final result Specimen:  Blood from Arm, Right Updated:  10/13/18 0310     Lipase 128 u/L     Comprehensive metabolic panel [[id number]]  (Abnormal) Collected:  10/13/18 0248    Lab Status:  Final result Specimen:  Blood from Arm, Right Updated:  10/13/18 0310     Sodium 135 (L) mmol/L      Potassium 3 2 (L) mmol/L      Chloride 99 (L) mmol/L      CO2 24 mmol/L      ANION GAP 12 mmol/L      BUN 9 mg/dL      Creatinine 1 15 mg/dL      Glucose 128 mg/dL      Calcium 9 9 mg/dL      AST 14 U/L      ALT 29 U/L      Alkaline Phosphatase 75 U/L      Total Protein 7 8 g/dL      Albumin 4 3 g/dL      Total Bilirubin 0 50 mg/dL      eGFR 81 ml/min/1 73sq m     Narrative:         National Kidney Disease Education Program recommendations are as follows:  GFR calculation is accurate only with a steady state creatinine  Chronic Kidney disease less than 60 ml/min/1 73 sq  meters  Kidney failure less than 15 ml/min/1 73 sq  meters      CBC and differential [[id number]]  (Abnormal) Collected:  10/13/18 0248    Lab Status:  Final result Specimen:  Blood from Arm, Right Updated:  10/13/18 0254 WBC 12 46 (H) Thousand/uL      RBC 5 46 Million/uL      Hemoglobin 14 8 g/dL      Hematocrit 44 4 %      MCV 81 (L) fL      MCH 27 1 pg      MCHC 33 3 g/dL      RDW 13 4 %      MPV 10 7 fL      Platelets 611 Thousands/uL      nRBC 0 /100 WBCs      Neutrophils Relative 71 %      Immat GRANS % 0 %      Lymphocytes Relative 21 %      Monocytes Relative 8 %      Eosinophils Relative 0 %      Basophils Relative 0 %      Neutrophils Absolute 8 68 (H) Thousands/µL      Immature Grans Absolute 0 04 Thousand/uL      Lymphocytes Absolute 2 65 Thousands/µL      Monocytes Absolute 1 04 Thousand/µL      Eosinophils Absolute 0 00 Thousand/µL      Basophils Absolute 0 05 Thousands/µL                  XR chest pa & lateral   Final Result by Zach Stringer MD (10/13 0522)      No radiographic evidence of acute intrathoracic process or significant interval change  Workstation performed: [id number]         CTA abdomen pelvis w wo contrast   Final Result by Erica Welsh MD (10/13 0421)      No evidence of mesenteric ischemia  Unremarkable CT angiogram of the abdomen and pelvis  Workstation performed: [id number]                    Procedures  Procedures       Phone Contacts  ED Phone Contact    ED Course  ED Course as of Oct 14 1221   Sat Oct 13, 2018   0443 XR chest pa & lateral                               MDM  Number of Diagnoses or Management Options  Abdominal pain, unspecified abdominal location: new and requires workup  Lactic acidosis: new and requires workup  Diagnosis management comments: Patient is crying, writhing in pain, appears uncomfortable on arrival   He exhibits pressured speech  Reports that his pain is most severe over the left flank, but radiates to the right flank and across the entire abdomen  He has tenderness to palpation over the left flank as well as generalized across the abdomen, but without guarding or peritonitis    Patient is also actively dry heaving, and had 1 episode of nonbloody nonbilious emesis in the ED  Normal saline bolus started, patient given dose of Zofran  Patient refuses opioid pain medications "because my brother  from heroin "  Will give 2 5 mg of Haldol  Differential diagnosis includes intra-abdominal surgical pathology such as perforated viscus, AAA, less likely mesenteric ischemia given lack of risk factors, nephrolithiasis  Other possible etiologies include cannabinoid hyperemesis (pt smokes multiple times daily), cardiac etiology, or psychiatric  Patient vehemently denies drug use other than marijuana or other ingestions  After receiving 2 5 mg of Haldol, patient reports this is abdominal pain is significantly improved  He is no longer vomiting, and has now calmed  His abdominal exam is largely benign, with only mild generalized tenderness to palpation  CT abdomen pelvis was performed with no evidence of intra-abdominal pathology to explain his symptoms, including no evidence of mesenteric ischemia  Labs remarkable for lactic acidosis to 5, leukocytosis to 12  Patient given 30 cc/kilos bolus of normal saline as well as Zosyn for empiric treatment of possible intra-abdominal infection, though my suspicion is that the patient's lactic acidosis is in the setting of vomiting and dehydration rather than sepsis  Lactate improved to 2 7 following fluid bolus  CMP remarkable only for mild hypokalemia  Lipase is not elevated  Gastroccult of the patient's vomitus is negative for blood  Rapid drug screen positive for THC  Urinalysis not indicative of UTI  I personally interpreted the patient's EKG, which shows sinus rhythm, normal axis, first-degree AV block, no ischemic changes  First troponin is undetectable making underlying cardiac etiology unlikely, the 2nd troponin is pending  Patient is significantly more comfortable after Haldol    Blood and urine cultures obtained will continue to treat with Zosyn, though suspect dehydration in the setting of vomiting  Patient admitted to Medicine for further management             Amount and/or Complexity of Data Reviewed  Clinical lab tests: ordered and reviewed  Tests in the radiology section of CPT®: ordered and reviewed  Independent visualization of images, tracings, or specimens: yes    Patient Progress  Patient progress: improved    CritCare Time     Disposition  Final diagnoses:   Abdominal pain, unspecified abdominal location   Lactic acidosis     Time reflects when diagnosis was documented in both MDM as applicable and the Disposition within this note     Time User Action Codes Description Comment    10/13/2018  5:01 AM Cherre Other Add [R11 2] Intractable nausea and vomiting     10/13/2018  5:01 AM Cherre Other Add [R10 9] Abdominal pain     10/13/2018  5:09 AM Osito Acosta Add [R10 9] Abdominal pain, unspecified abdominal location     10/13/2018  5:10 AM Osito Acosta Add [E87 2] Lactic acidosis       ED Disposition     ED Disposition Condition Comment    Admit  Admitting Physician: Stepan Sanches [[id number]]   Level of Care: Med Surg [16]        Follow-up Information     Follow up With Specialties Details Why Contact Info    Zain Rapp MD Gastroenterology Follow up  [address] [phone]            Discharge Medication List as of 10/13/2018  5:42 PM      START taking these medications    Details   pantoprazole (PROTONIX) 40 mg tablet Take 1 tablet (40 mg total) by mouth daily, Starting Sat 10/13/2018, Print         CONTINUE these medications which have NOT CHANGED    Details   guaiFENesin (MUCINEX) 600 mg 12 hr tablet Take 1,200 mg by mouth daily as needed for cough, Historical Med      albuterol (PROVENTIL HFA,VENTOLIN HFA) 90 mcg/act inhaler Inhale 2 puffs every 4 (four) hours as needed for wheezing, Starting Tue 10/31/2017, Print      azithromycin (ZITHROMAX) 250 mg tablet Take 2 tablets today then 1 tablet daily x 4 days, Print      fluticasone (FLOVENT HFA) 110 MCG/ACT inhaler Inhale 1 puff 2 (two) times a day, Starting Tue 10/31/2017, Print      predniSONE 20 mg tablet Take 3 tablets (60 mg total) by mouth daily for 4 days, Starting Fri 10/12/2018, Until Tue 10/16/2018, Print         STOP taking these medications       pseudoephedrine (SUDAFED) 60 mg tablet Comments:   Reason for Stopping:               Outpatient Discharge Orders  Discharge Diet     Activity as tolerated         ED Provider  Electronically Signed by           Nieves Lopez MD  10/14/18 0526

## 2018-10-13 NOTE — ASSESSMENT & PLAN NOTE
- visited the ER a few days ago and was discharged with prescription for steroid taper along with Sudafed for symptomatic relief and a short course of Zithromax (to be continued through 10/15)   - CXR was negative for acute cardiopulmonary disease   - PRN Albuterol inhaler on board   - tobacco smoking cessation encouraged

## 2018-10-14 LAB — BACTERIA UR CULT: NORMAL

## 2018-10-18 LAB
BACTERIA BLD CULT: NORMAL
BACTERIA BLD CULT: NORMAL

## 2018-10-20 LAB
ATRIAL RATE: 54 BPM
P AXIS: 16 DEGREES
QRS AXIS: 79 DEGREES
QRSD INTERVAL: 100 MS
QT INTERVAL: 470 MS
QTC INTERVAL: 424 MS
T WAVE AXIS: 69 DEGREES
VENTRICULAR RATE: 49 BPM

## 2018-10-20 PROCEDURE — 93010 ELECTROCARDIOGRAM REPORT: CPT | Performed by: INTERNAL MEDICINE

## 2018-10-28 ENCOUNTER — HOSPITAL ENCOUNTER (EMERGENCY)
Facility: HOSPITAL | Age: 38
Discharge: HOME | End: 2018-10-28
Attending: EMERGENCY MEDICINE | Admitting: EMERGENCY MEDICINE
Payer: COMMERCIAL

## 2018-10-28 VITALS
HEART RATE: 103 BPM | RESPIRATION RATE: 18 BRPM | TEMPERATURE: 98.6 F | SYSTOLIC BLOOD PRESSURE: 126 MMHG | BODY MASS INDEX: 28.87 KG/M2 | OXYGEN SATURATION: 99 % | WEIGHT: 224.87 LBS | DIASTOLIC BLOOD PRESSURE: 70 MMHG

## 2018-10-28 DIAGNOSIS — J40 BRONCHITIS: Primary | ICD-10-CM

## 2018-10-28 PROCEDURE — 93005 ELECTROCARDIOGRAM TRACING: CPT

## 2018-10-28 PROCEDURE — 99285 EMERGENCY DEPT VISIT HI MDM: CPT

## 2018-10-28 PROCEDURE — 94640 AIRWAY INHALATION TREATMENT: CPT

## 2018-10-28 RX ORDER — AZITHROMYCIN 250 MG/1
TABLET, FILM COATED ORAL
Qty: 6 TABLET | Refills: 0 | Status: SHIPPED | OUTPATIENT
Start: 2018-10-28 | End: 2018-11-02

## 2018-10-28 RX ORDER — ALBUTEROL SULFATE 2.5 MG/3ML
5 SOLUTION RESPIRATORY (INHALATION) ONCE
Status: COMPLETED | OUTPATIENT
Start: 2018-10-28 | End: 2018-10-28

## 2018-10-28 RX ORDER — ALBUTEROL SULFATE 90 UG/1
2 AEROSOL, METERED RESPIRATORY (INHALATION) ONCE
Status: COMPLETED | OUTPATIENT
Start: 2018-10-28 | End: 2018-10-28

## 2018-10-28 RX ORDER — ALBUTEROL SULFATE 90 UG/1
2 AEROSOL, METERED RESPIRATORY (INHALATION) EVERY 6 HOURS PRN
Qty: 1 INHALER | Refills: 0 | Status: SHIPPED | OUTPATIENT
Start: 2018-10-28 | End: 2018-11-07

## 2018-10-28 RX ORDER — PREDNISONE 20 MG/1
60 TABLET ORAL DAILY
Qty: 15 TABLET | Refills: 0 | Status: SHIPPED | OUTPATIENT
Start: 2018-10-28 | End: 2018-11-02

## 2018-10-28 RX ADMIN — ALBUTEROL SULFATE 2 PUFF: 90 AEROSOL, METERED RESPIRATORY (INHALATION) at 19:50

## 2018-10-28 RX ADMIN — IPRATROPIUM BROMIDE 0.5 MG: 0.5 SOLUTION RESPIRATORY (INHALATION) at 19:02

## 2018-10-28 RX ADMIN — ALBUTEROL SULFATE 5 MG: 2.5 SOLUTION RESPIRATORY (INHALATION) at 19:02

## 2018-10-28 NOTE — ED PROVIDER NOTES
History  Chief Complaint   Patient presents with    Shortness of Breath     patient presents to the ED with c/o SOB and asthma  Patient appears Chandrika Going comes in today with continued shortness of breath and wheezing  Reports his initial peak flow was 250 and mid breathing treatment was 400  He reports he has been dealing with this for a while unfortunately is medication  regiment was stopped by hospital admission  States he has been using his inhaler with relief but he ran out today  Prior to Admission Medications   Prescriptions Last Dose Informant Patient Reported? Taking? albuterol (PROVENTIL HFA,VENTOLIN HFA) 90 mcg/act inhaler   No No   Sig: Inhale 2 puffs every 4 (four) hours as needed for wheezing   fluticasone (FLOVENT HFA) 110 MCG/ACT inhaler   No No   Sig: Inhale 1 puff 2 (two) times a day   guaiFENesin (MUCINEX) 600 mg 12 hr tablet  Self Yes No   Sig: Take 1,200 mg by mouth daily as needed for cough   pantoprazole (PROTONIX) 40 mg tablet   No No   Sig: Take 1 tablet (40 mg total) by mouth daily      Facility-Administered Medications: None       Past Medical History:   Diagnosis Date    Asthma     Chronic pain     back pain       History reviewed  No pertinent surgical history  History reviewed  No pertinent family history  I have reviewed and agree with the history as documented  Social History   Substance Use Topics    Smoking status: Former Smoker     Packs/day: 0 25    Smokeless tobacco: Never Used      Comment: quit 10/27    Alcohol use No        Review of Systems   Constitutional: Negative for diaphoresis, fatigue and fever  HENT: Negative for congestion, ear pain, nosebleeds and sore throat  Eyes: Negative for photophobia, pain, discharge and visual disturbance  Respiratory: Positive for cough, shortness of breath and wheezing  Negative for choking and chest tightness  Cardiovascular: Negative for chest pain and palpitations     Gastrointestinal: Negative for abdominal distention, abdominal pain, diarrhea and vomiting  Genitourinary: Negative for dysuria, flank pain and frequency  Musculoskeletal: Negative for back pain, gait problem and joint swelling  Skin: Negative for color change and rash  Neurological: Negative for dizziness, syncope and headaches  Psychiatric/Behavioral: Negative for behavioral problems and confusion  The patient is not nervous/anxious  All other systems reviewed and are negative  Physical Exam  Physical Exam   Constitutional: He is oriented to person, place, and time  He appears well-developed and well-nourished  HENT:   Head: Normocephalic and atraumatic  Eyes: Pupils are equal, round, and reactive to light  Neck: Normal range of motion  Neck supple  Cardiovascular: Normal rate, regular rhythm, normal heart sounds and normal pulses  PMI is not displaced  Pulmonary/Chest: Effort normal  No respiratory distress  He has wheezes  Abdominal: Soft  He exhibits no distension  There is no guarding  Musculoskeletal: Normal range of motion  Lymphadenopathy:     He has no cervical adenopathy  Neurological: He is alert and oriented to person, place, and time  Skin: Skin is warm and dry  No rash noted  He is not diaphoretic  No pallor  Psychiatric: He has a normal mood and affect  Vitals reviewed        Vital Signs  ED Triage Vitals   Temperature Pulse Respirations Blood Pressure SpO2   10/28/18 1827 10/28/18 1825 10/28/18 1825 10/28/18 1825 10/28/18 1825   98 6 °F (37 °C) 95 21 126/70 96 %      Temp Source Heart Rate Source Patient Position - Orthostatic VS BP Location FiO2 (%)   10/28/18 1827 -- -- -- --   Oral          Pain Score       --                  Vitals:    10/28/18 1825   BP: 126/70   Pulse: 95       Visual Acuity      ED Medications  Medications   albuterol inhalation solution 5 mg (5 mg Nebulization Given 10/28/18 1902)   ipratropium (ATROVENT) 0 02 % inhalation solution 0 5 mg (0 5 mg Nebulization Given 10/28/18 1902)       Diagnostic Studies  Results Reviewed     None                 No orders to display              Procedures  Procedures       Phone Contacts  ED Phone Contact    ED Course                               MDM  CritCare Time    Disposition  Final diagnoses:   Bronchitis     Time reflects when diagnosis was documented in both MDM as applicable and the Disposition within this note     Time User Action Codes Description Comment    10/28/2018  7:41 PM [address] Bronchitis       ED Disposition     ED Disposition Condition Comment    Discharge  [address] discharge to home/self care  Condition at discharge: Good        Follow-up Information     Follow up With Specialties Details Why [facility] Emergency Department Emergency Medicine  If your symptoms worsen, or you are not improving  [address] [address] ED, [address] [place], [place], [address]          Patient's Medications   Discharge Prescriptions    ALBUTEROL (PROVENTIL HFA,VENTOLIN HFA) 90 MCG/ACT INHALER    Inhale 2 puffs every 6 (six) hours as needed for wheezing for up to 10 days       Start Date: 10/28/2018End Date: 11/7/2018       Order Dose: 2 puffs       Quantity: 1 Inhaler    Refills: 0    AZITHROMYCIN (ZITHROMAX) 250 MG TABLET    Take 500 mg day 1, 250 mg days 2-5       Start Date: 10/28/2018End Date: 11/2/2018       Order Dose: --       Quantity: 6 tablet    Refills: 0    PREDNISONE 20 MG TABLET    Take 3 tablets (60 mg total) by mouth daily for 5 days       Start Date: 10/28/2018End Date: 11/2/2018       Order Dose: 60 mg       Quantity: 15 tablet    Refills: 0     No discharge procedures on file      ED Provider  Electronically Signed by           LEONID Carlisle  10/28/18 5776

## 2018-10-28 NOTE — DISCHARGE INSTRUCTIONS
Acute Bronchitis   WHAT YOU NEED TO KNOW:   Acute bronchitis is swelling and irritation in the air passages of your lungs  This irritation may cause you to cough or have other breathing problems  Acute bronchitis often starts because of another illness, such as a cold or the flu  The illness spreads from your nose and throat to your windpipe and airways  Bronchitis is often called a chest cold  Acute bronchitis lasts about 3 to 6 weeks and is usually not a serious illness  Your cough can last for several weeks  DISCHARGE INSTRUCTIONS:   Return to the emergency department if:   · You cough up blood  · Your lips or fingernails turn blue  · You feel like you are not getting enough air when you breathe  Contact your healthcare provider if:   · You have a fever  · Your breathing problems do not go away or get worse  · Your cough does not get better within 4 weeks  · You have questions or concerns about your condition or care  Self-care:   · Get more rest   Rest helps your body to heal  Slowly start to do more each day  Rest when you feel it is needed  · Avoid irritants in the air  Avoid chemicals, fumes, and dust  Wear a face mask if you must work around dust or fumes  Stay inside on days when air pollution levels are high  If you have allergies, stay inside when pollen counts are high  Do not use aerosol products, such as spray-on deodorant, bug spray, and hair spray  · Do not smoke or be around others who smoke  Nicotine and other chemicals in cigarettes and cigars damages the cilia that move mucus out of your lungs  Ask your healthcare provider for information if you currently smoke and need help to quit  E-cigarettes or smokeless tobacco still contain nicotine  Talk to your healthcare provider before you use these products  · Drink liquids as directed  Liquids help keep your air passages moist and help you cough up mucus   You may need to drink more liquids when you have acute bronchitis  Ask how much liquid to drink each day and which liquids are best for you  · Use a humidifier or vaporizer  Use a cool mist humidifier or a vaporizer to increase air moisture in your home  This may make it easier for you to breathe and help decrease your cough  Decrease risk for acute bronchitis:   · Get the vaccinations you need  Ask your healthcare provider if you should get vaccinated against the flu or pneumonia  · Prevent the spread of germs  You can decrease your risk of acute bronchitis and other illnesses by doing the following:     [facility] [place] [facility] your hands often with soap and water  Carry germ-killing hand lotion or gel with you  You can use the lotion or gel to clean your hands when soap and water are not available  ¨ Do not touch your eyes, nose, or mouth unless you have washed your hands first     ¨ Always cover your mouth when you cough to prevent the spread of germs  It is best to cough into a tissue or your shirt sleeve instead of into your hand  Ask those around you cover their mouths when they cough  ¨ Try to avoid people who have a cold or the flu  If you are sick, stay away from others as much as possible  Medicines: Your healthcare provider may  give you any of the following:  · Ibuprofen or acetaminophen  are medicines that help lower your fever  They are available without a doctor's order  Ask your healthcare provider which medicine is right for you  Ask how much to take and how often to take it  Follow directions  These medicines can cause stomach bleeding if not taken correctly  Ibuprofen can cause kidney damage  Do not take ibuprofen if you have kidney disease, an ulcer, or allergies to aspirin  Acetaminophen can cause liver damage  Do not take more than 4,000 milligrams in 24 hours  · Decongestants  help loosen mucus in your lungs and make it easier to cough up  This can help you breathe easier  · Cough suppressants  decrease your urge to cough   If your cough produces mucus, do not take a cough suppressant unless your healthcare provider tells you to  Your healthcare provider may suggest that you take a cough suppressant at night so you can rest     · Inhalers  may be given  Your healthcare provider may give you one or more inhalers to help you breathe easier and cough less  An inhaler gives your medicine to open your airways  Ask your healthcare provider to show you how to use your inhaler correctly  · Take your medicine as directed  Contact your healthcare provider if you think your medicine is not helping or if you have side effects  Tell him of her if you are allergic to any medicine  Keep a list of the medicines, vitamins, and herbs you take  Include the amounts, and when and why you take them  Bring the list or the pill bottles to follow-up visits  Carry your medicine list with you in case of an emergency  Follow up with your healthcare provider as directed:  Write down questions you have so you will remember to ask them during your follow-up visits  © 2017 [employer] Information is for End User's use only and may not be sold, redistributed or otherwise used for commercial purposes  All illustrations and images included in CareNotes® are the copyrighted property of [employer]  or Ari Rodriguez  The above information is an  only  It is not intended as medical advice for individual conditions or treatments  Talk to your doctor, nurse or pharmacist before following any medical regimen to see if it is safe and effective for you

## 2018-10-29 LAB
ATRIAL RATE: 85 BPM
P AXIS: 74 DEGREES
PR INTERVAL: 134 MS
QRS AXIS: 90 DEGREES
QRSD INTERVAL: 88 MS
QT INTERVAL: 346 MS
QTC INTERVAL: 411 MS
T WAVE AXIS: 70 DEGREES
VENTRICULAR RATE: 85 BPM

## 2018-10-29 PROCEDURE — 93010 ELECTROCARDIOGRAM REPORT: CPT | Performed by: INTERNAL MEDICINE

## 2019-03-20 ENCOUNTER — HOSPITAL ENCOUNTER (EMERGENCY)
Facility: HOSPITAL | Age: 39
Discharge: HOME | End: 2019-03-20
Attending: EMERGENCY MEDICINE | Admitting: EMERGENCY MEDICINE
Payer: COMMERCIAL

## 2019-03-20 ENCOUNTER — APPOINTMENT (EMERGENCY)
Dept: RADIOLOGY | Facility: HOSPITAL | Age: 39
End: 2019-03-20
Payer: COMMERCIAL

## 2019-03-20 VITALS
HEART RATE: 80 BPM | SYSTOLIC BLOOD PRESSURE: 135 MMHG | HEIGHT: 74 IN | RESPIRATION RATE: 19 BRPM | DIASTOLIC BLOOD PRESSURE: 80 MMHG | TEMPERATURE: 97.9 F | OXYGEN SATURATION: 94 % | WEIGHT: 235.45 LBS | BODY MASS INDEX: 30.22 KG/M2

## 2019-03-20 DIAGNOSIS — R06.2 WHEEZING: ICD-10-CM

## 2019-03-20 DIAGNOSIS — J45.901 ASTHMA EXACERBATION: Primary | ICD-10-CM

## 2019-03-20 PROCEDURE — 96372 THER/PROPH/DIAG INJ SC/IM: CPT

## 2019-03-20 PROCEDURE — 94644 CONT INHLJ TX 1ST HOUR: CPT

## 2019-03-20 PROCEDURE — 99284 EMERGENCY DEPT VISIT MOD MDM: CPT

## 2019-03-20 PROCEDURE — 71046 X-RAY EXAM CHEST 2 VIEWS: CPT

## 2019-03-20 PROCEDURE — 94760 N-INVAS EAR/PLS OXIMETRY 1: CPT

## 2019-03-20 RX ORDER — METHYLPREDNISOLONE SODIUM SUCCINATE 125 MG/2ML
125 INJECTION, POWDER, LYOPHILIZED, FOR SOLUTION INTRAMUSCULAR; INTRAVENOUS ONCE
Status: COMPLETED | OUTPATIENT
Start: 2019-03-20 | End: 2019-03-20

## 2019-03-20 RX ORDER — PREDNISONE 10 MG/1
TABLET ORAL
Qty: 40 TABLET | Refills: 0 | Status: SHIPPED | OUTPATIENT
Start: 2019-03-20 | End: 2019-05-04

## 2019-03-20 RX ORDER — ALBUTEROL SULFATE 90 UG/1
2 AEROSOL, METERED RESPIRATORY (INHALATION) EVERY 4 HOURS PRN
Qty: 1 INHALER | Refills: 0 | Status: SHIPPED | OUTPATIENT
Start: 2019-03-20 | End: 2020-07-20 | Stop reason: CLARIF

## 2019-03-20 RX ORDER — ALBUTEROL SULFATE 2.5 MG/3ML
2.5 SOLUTION RESPIRATORY (INHALATION) EVERY 6 HOURS PRN
Qty: 75 ML | Refills: 0 | Status: SHIPPED | OUTPATIENT
Start: 2019-03-20 | End: 2019-04-19

## 2019-03-20 RX ORDER — SODIUM CHLORIDE FOR INHALATION 0.9 %
3 VIAL, NEBULIZER (ML) INHALATION ONCE
Status: COMPLETED | OUTPATIENT
Start: 2019-03-20 | End: 2019-03-20

## 2019-03-20 RX ADMIN — ISODIUM CHLORIDE 3 ML: 0.03 SOLUTION RESPIRATORY (INHALATION) at 10:11

## 2019-03-20 RX ADMIN — ALBUTEROL SULFATE 10 MG: 2.5 SOLUTION RESPIRATORY (INHALATION) at 10:11

## 2019-03-20 RX ADMIN — METHYLPREDNISOLONE SODIUM SUCCINATE 125 MG: 125 INJECTION, POWDER, FOR SOLUTION INTRAMUSCULAR; INTRAVENOUS at 10:07

## 2019-03-20 RX ADMIN — IPRATROPIUM BROMIDE 1 MG: 0.5 SOLUTION RESPIRATORY (INHALATION) at 10:11

## 2019-03-20 NOTE — ED PROVIDER NOTES
History  Chief Complaint   Patient presents with    Asthma     Pt presents to ER with c/o asthma attack that started this morning at 0300  States that he tried different holistic approaches and advair to help until he could get here and it did not relieve symptoms  HPI  30-year-old white male with a chief complaint of having an asthma attack  Patient states that he had difficulty breathing throughout the night and he woke up in his CT was sitting on his chest   Patient states that he is out of all of his asthma medications except for his Advair  Patient states he quit smoking cigarettes approximately 5 years ago but he still uses cannabis  Patient denies any fever or chills  Patient states he has some white productive sputum  Patient has never been intubated or admitted to the hospital specifically for his asthma  Prior to Admission Medications   Prescriptions Last Dose Informant Patient Reported? Taking? albuterol (PROVENTIL HFA,VENTOLIN HFA) 90 mcg/act inhaler   No No   Sig: Inhale 2 puffs every 4 (four) hours as needed for wheezing   fluticasone (FLOVENT HFA) 110 MCG/ACT inhaler   No No   Sig: Inhale 1 puff 2 (two) times a day   guaiFENesin (MUCINEX) 600 mg 12 hr tablet  Self Yes No   Sig: Take 1,200 mg by mouth daily as needed for cough   pantoprazole (PROTONIX) 40 mg tablet   No No   Sig: Take 1 tablet (40 mg total) by mouth daily      Facility-Administered Medications: None       Past Medical History:   Diagnosis Date    Asthma     Chronic pain     back pain       History reviewed  No pertinent surgical history  History reviewed  No pertinent family history  I have reviewed and agree with the history as documented      Social History     Tobacco Use    Smoking status: Former Smoker     Packs/day: 0 25    Smokeless tobacco: Never Used    Tobacco comment: quit 10/27   Substance Use Topics    Alcohol use: No    Drug use: Yes     Frequency: 1 0 times per week     Types: Marijuana Review of Systems   Constitutional: Negative for diaphoresis, fatigue and fever  HENT: Negative for congestion, ear pain, nosebleeds and sore throat  Eyes: Negative for photophobia, pain, discharge and visual disturbance  Respiratory: Positive for cough, chest tightness, shortness of breath and wheezing  Negative for choking  Cardiovascular: Negative for chest pain and palpitations  Gastrointestinal: Negative for abdominal distention, abdominal pain, diarrhea and vomiting  Genitourinary: Negative for dysuria, flank pain and frequency  Musculoskeletal: Negative for back pain, gait problem and joint swelling  Skin: Negative for color change and rash  Neurological: Negative for dizziness, syncope and headaches  Psychiatric/Behavioral: Negative for behavioral problems and confusion  The patient is not nervous/anxious  All other systems reviewed and are negative  Physical Exam  Physical Exam   Constitutional: He is oriented to person, place, and time  He appears well-developed and well-nourished  77-year-old white male lying on the stretcher with a harsh cough  HENT:   Head: Normocephalic and atraumatic  Mouth/Throat: Oropharynx is clear and moist    Eyes: Pupils are equal, round, and reactive to light  EOM are normal    Neck: Normal range of motion  Neck supple  Cardiovascular: Normal rate, regular rhythm and normal heart sounds  Pulmonary/Chest: No respiratory distress  He has wheezes  Patient has extremely coarse breath sounds bilaterally with inspiratory and expiratory wheezing  Abdominal: Soft  Bowel sounds are normal  There is no tenderness  There is no rebound and no guarding  Musculoskeletal: Normal range of motion  Neurological: He is alert and oriented to person, place, and time  No cranial nerve deficit  He exhibits normal muscle tone  Coordination normal    Skin: Skin is warm and dry  Psychiatric: He has a normal mood and affect     Nursing note and vitals reviewed  Vital Signs  ED Triage Vitals [03/20/19 0928]   Temperature Pulse Respirations Blood Pressure SpO2   97 9 °F (36 6 °C) 82 20 141/80 94 %      Temp Source Heart Rate Source Patient Position - Orthostatic VS BP Location FiO2 (%)   Oral Monitor Sitting Right arm --      Pain Score       No Pain           Vitals:    03/20/19 0928 03/20/19 1203   BP: 141/80 135/80   Pulse: 82 80   Patient Position - Orthostatic VS: Sitting          Visual Acuity      ED Medications  Medications   albuterol inhalation solution 10 mg (10 mg Nebulization Given 3/20/19 1011)     And   ipratropium (ATROVENT) 0 02 % inhalation solution 1 mg (1 mg Nebulization Given 3/20/19 1011)     And   sodium chloride 0 9 % inhalation solution 3 mL (3 mL Nebulization Given 3/20/19 1011)   methylPREDNISolone sodium succinate (Solu-MEDROL) injection 125 mg (125 mg Intramuscular Given 3/20/19 1007)       Diagnostic Studies  Results Reviewed     None                 XR chest 2 views   Final Result by Deandre Gardner MD (03/20 1114)      No acute consolidation or congestion      Workstation performed: [id number]                    Procedures  Procedures       Phone Contacts  ED Phone Contact    ED Course      I re-evaluated patient after his nebulizer treatment and patient felt much better  Patient was moving much more air although wheezing persisted  I placed patient on some prednisone as well as nebulizer and inhaler and gave him follow-up with a family doctor since patient did not have one  MDM     Differential diagnosis includes:  1  Acute respiratory distress  2  Acute asthmatic attack  3  Bronchitis  4  Rule out pneumonia  5  Bronchospasm  6   Cannabis use     Disposition  Final diagnoses:   Asthma exacerbation   Wheezing     Time reflects when diagnosis was documented in both MDM as applicable and the Disposition within this note     Time User Action Codes Description Comment    3/20/2019 11:38 AM Jeff Ivy ASHKAN Add [J45 901] Asthma exacerbation     3/20/2019 11:38 AM Author Amber Add [R06 2] Wheezing       ED Disposition     ED Disposition Condition Date/Time Comment    Discharge Stable Wed Mar 20, 2019 11:38 AM Chano Bee discharge to home/self care  Follow-up Information     Follow up With Specialties Details Why Contact Info    Fay Scheuermann, DO Internal Medicine In 1 week  [address]  [phone]            Discharge Medication List as of 3/20/2019 11:42 AM      START taking these medications    Details   albuterol (2 5 mg/3 mL) 0 083 % nebulizer solution Take 1 vial (2 5 mg total) by nebulization every 6 (six) hours as needed for wheezing for up to 30 days, Starting Wed 3/20/2019, Until Fri 4/19/2019, Print      !! albuterol (PROVENTIL HFA,VENTOLIN HFA) 90 mcg/act inhaler Inhale 2 puffs every 4 (four) hours as needed for wheezing, Starting Wed 3/20/2019, Print      predniSONE 10 mg tablet Take 4 pills x4 days, then 3 pills x4 days, then 2 pills x4 days, and then 1 pill x4 days, Print       !! - Potential duplicate medications found  Please discuss with provider  CONTINUE these medications which have NOT CHANGED    Details   !! albuterol (PROVENTIL HFA,VENTOLIN HFA) 90 mcg/act inhaler Inhale 2 puffs every 4 (four) hours as needed for wheezing, Starting Tue 10/31/2017, Print      fluticasone (FLOVENT HFA) 110 MCG/ACT inhaler Inhale 1 puff 2 (two) times a day, Starting Tue 10/31/2017, Print      guaiFENesin (MUCINEX) 600 mg 12 hr tablet Take 1,200 mg by mouth daily as needed for cough, Historical Med      pantoprazole (PROTONIX) 40 mg tablet Take 1 tablet (40 mg total) by mouth daily, Starting Sat 10/13/2018, Print       !! - Potential duplicate medications found  Please discuss with provider  No discharge procedures on file      ED Provider  Electronically Signed by           Amadou Barahona DO  03/20/19 7963

## 2019-05-04 ENCOUNTER — HOSPITAL ENCOUNTER (EMERGENCY)
Facility: HOSPITAL | Age: 39
Discharge: HOME | End: 2019-05-04
Attending: EMERGENCY MEDICINE
Payer: COMMERCIAL

## 2019-05-04 VITALS
TEMPERATURE: 98.4 F | OXYGEN SATURATION: 96 % | BODY MASS INDEX: 30.4 KG/M2 | HEART RATE: 88 BPM | DIASTOLIC BLOOD PRESSURE: 69 MMHG | SYSTOLIC BLOOD PRESSURE: 124 MMHG | RESPIRATION RATE: 18 BRPM | WEIGHT: 236.77 LBS
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
## 2019-05-04 DIAGNOSIS — S30.861A TICK BITE OF ABDOMINAL WALL, INITIAL ENCOUNTER: Primary | ICD-10-CM

## 2019-05-04 PROCEDURE — 99283 EMERGENCY DEPT VISIT LOW MDM: CPT | Performed by: EMERGENCY MEDICINE

## 2019-05-04 PROCEDURE — 99282 EMERGENCY DEPT VISIT SF MDM: CPT

## 2019-05-15 ENCOUNTER — APPOINTMENT (EMERGENCY)
Dept: CT IMAGING | Facility: HOSPITAL | Age: 39
End: 2019-05-15
Payer: COMMERCIAL

## 2019-05-15 ENCOUNTER — APPOINTMENT (EMERGENCY)
Dept: RADIOLOGY | Facility: HOSPITAL | Age: 39
End: 2019-05-15
Payer: COMMERCIAL

## 2019-05-15 ENCOUNTER — HOSPITAL ENCOUNTER (EMERGENCY)
Facility: HOSPITAL | Age: 39
Discharge: HOME | End: 2019-05-15
Attending: EMERGENCY MEDICINE | Admitting: EMERGENCY MEDICINE
Payer: COMMERCIAL

## 2019-05-15 VITALS
WEIGHT: 235.89 LBS | BODY MASS INDEX: 30.27 KG/M2 | RESPIRATION RATE: 18 BRPM | HEART RATE: 69 BPM | DIASTOLIC BLOOD PRESSURE: 77 MMHG | TEMPERATURE: 98.4 F | OXYGEN SATURATION: 100 % | HEIGHT: 74 IN | SYSTOLIC BLOOD PRESSURE: 139 MMHG

## 2019-05-15 DIAGNOSIS — F12.90 CANNABINOID HYPEREMESIS SYNDROME: Primary | ICD-10-CM

## 2019-05-15 DIAGNOSIS — R19.7 NAUSEA, VOMITING, AND DIARRHEA: ICD-10-CM

## 2019-05-15 DIAGNOSIS — R10.9 ACUTE ABDOMINAL PAIN: ICD-10-CM

## 2019-05-15 DIAGNOSIS — R11.2 NAUSEA, VOMITING, AND DIARRHEA: ICD-10-CM

## 2019-05-15 DIAGNOSIS — R11.2 CANNABINOID HYPEREMESIS SYNDROME: Primary | ICD-10-CM

## 2019-05-15 LAB
ALBUMIN SERPL BCP-MCNC: 2.4 G/DL (ref 3.5–5)
ALP SERPL-CCNC: 79 U/L (ref 46–116)
ALT SERPL W P-5'-P-CCNC: 49 U/L (ref 12–78)
AMPHETAMINES SERPL QL SCN: NEGATIVE
ANION GAP SERPL CALCULATED.3IONS-SCNC: 8 MMOL/L (ref 4–13)
AST SERPL W P-5'-P-CCNC: 25 U/L (ref 5–45)
BACTERIA UR QL AUTO: NORMAL /HPF
BARBITURATES UR QL: NEGATIVE
BASOPHILS # BLD AUTO: 0.07 THOUSANDS/ΜL (ref 0–0.1)
BASOPHILS NFR BLD AUTO: 1 % (ref 0–1)
BENZODIAZ UR QL: NEGATIVE
BILIRUB DIRECT SERPL-MCNC: 0.1 MG/DL (ref 0–0.2)
BILIRUB SERPL-MCNC: 0.5 MG/DL (ref 0.2–1)
BILIRUB UR QL STRIP: NEGATIVE
BUN SERPL-MCNC: 6 MG/DL (ref 5–25)
CALCIUM SERPL-MCNC: 9.6 MG/DL (ref 8.3–10.1)
CHLORIDE SERPL-SCNC: 104 MMOL/L (ref 100–108)
CLARITY UR: CLEAR
CO2 SERPL-SCNC: 28 MMOL/L (ref 21–32)
COCAINE UR QL: NEGATIVE
COLOR UR: YELLOW
CREAT SERPL-MCNC: 0.87 MG/DL (ref 0.6–1.3)
EOSINOPHIL # BLD AUTO: 0.29 THOUSAND/ΜL (ref 0–0.61)
EOSINOPHIL NFR BLD AUTO: 2 % (ref 0–6)
ERYTHROCYTE [DISTWIDTH] IN BLOOD BY AUTOMATED COUNT: 13.4 % (ref 11.6–15.1)
GFR SERPL CREATININE-BSD FRML MDRD: 110 ML/MIN/1.73SQ M
GLUCOSE SERPL-MCNC: 121 MG/DL (ref 65–140)
GLUCOSE UR STRIP-MCNC: NEGATIVE MG/DL
HCT VFR BLD AUTO: 45.9 % (ref 36.5–49.3)
HGB BLD-MCNC: 15.4 G/DL (ref 12–17)
HGB UR QL STRIP.AUTO: ABNORMAL
IMM GRANULOCYTES # BLD AUTO: 0.05 THOUSAND/UL (ref 0–0.2)
IMM GRANULOCYTES NFR BLD AUTO: 0 % (ref 0–2)
KETONES UR STRIP-MCNC: NEGATIVE MG/DL
LACTATE SERPL-SCNC: 2.4 MMOL/L (ref 0.5–2)
LACTATE SERPL-SCNC: 3.4 MMOL/L (ref 0.5–2)
LEUKOCYTE ESTERASE UR QL STRIP: NEGATIVE
LIPASE SERPL-CCNC: 87 U/L (ref 73–393)
LYMPHOCYTES # BLD AUTO: 1.34 THOUSANDS/ΜL (ref 0.6–4.47)
LYMPHOCYTES NFR BLD AUTO: 11 % (ref 14–44)
MCH RBC QN AUTO: 27.4 PG (ref 26.8–34.3)
MCHC RBC AUTO-ENTMCNC: 33.6 G/DL (ref 31.4–37.4)
MCV RBC AUTO: 82 FL (ref 82–98)
METHADONE UR QL: NEGATIVE
MONOCYTES # BLD AUTO: 0.61 THOUSAND/ΜL (ref 0.17–1.22)
MONOCYTES NFR BLD AUTO: 5 % (ref 4–12)
NEUTROPHILS # BLD AUTO: 10.12 THOUSANDS/ΜL (ref 1.85–7.62)
NEUTS SEG NFR BLD AUTO: 81 % (ref 43–75)
NITRITE UR QL STRIP: NEGATIVE
NON-SQ EPI CELLS URNS QL MICRO: NORMAL /HPF
NRBC BLD AUTO-RTO: 0 /100 WBCS
OPIATES UR QL SCN: NEGATIVE
PCP UR QL: NEGATIVE
PH UR STRIP.AUTO: 8 [PH]
PLATELET # BLD AUTO: 314 THOUSANDS/UL (ref 149–390)
PMV BLD AUTO: 10.2 FL (ref 8.9–12.7)
POTASSIUM SERPL-SCNC: 3.5 MMOL/L (ref 3.5–5.3)
PROT SERPL-MCNC: 7.6 G/DL (ref 6.4–8.2)
PROT UR STRIP-MCNC: NEGATIVE MG/DL
RBC # BLD AUTO: 5.62 MILLION/UL (ref 3.88–5.62)
RBC #/AREA URNS AUTO: NORMAL /HPF
SODIUM SERPL-SCNC: 140 MMOL/L (ref 136–145)
SP GR UR STRIP.AUTO: 1.02 (ref 1–1.03)
THC UR QL: POSITIVE
UROBILINOGEN UR QL STRIP.AUTO: 0.2 E.U./DL
WBC # BLD AUTO: 12.48 THOUSAND/UL (ref 4.31–10.16)
WBC #/AREA URNS AUTO: NORMAL /HPF

## 2019-05-15 PROCEDURE — 96375 TX/PRO/DX INJ NEW DRUG ADDON: CPT

## 2019-05-15 PROCEDURE — 80076 HEPATIC FUNCTION PANEL: CPT | Performed by: EMERGENCY MEDICINE

## 2019-05-15 PROCEDURE — 96374 THER/PROPH/DIAG INJ IV PUSH: CPT

## 2019-05-15 PROCEDURE — 85025 COMPLETE CBC W/AUTO DIFF WBC: CPT | Performed by: EMERGENCY MEDICINE

## 2019-05-15 PROCEDURE — 99284 EMERGENCY DEPT VISIT MOD MDM: CPT | Performed by: EMERGENCY MEDICINE

## 2019-05-15 PROCEDURE — 80048 BASIC METABOLIC PNL TOTAL CA: CPT | Performed by: EMERGENCY MEDICINE

## 2019-05-15 PROCEDURE — 81001 URINALYSIS AUTO W/SCOPE: CPT | Performed by: EMERGENCY MEDICINE

## 2019-05-15 PROCEDURE — 74177 CT ABD & PELVIS W/CONTRAST: CPT

## 2019-05-15 PROCEDURE — 36415 COLL VENOUS BLD VENIPUNCTURE: CPT | Performed by: EMERGENCY MEDICINE

## 2019-05-15 PROCEDURE — 83605 ASSAY OF LACTIC ACID: CPT | Performed by: EMERGENCY MEDICINE

## 2019-05-15 PROCEDURE — 83690 ASSAY OF LIPASE: CPT | Performed by: EMERGENCY MEDICINE

## 2019-05-15 PROCEDURE — 96361 HYDRATE IV INFUSION ADD-ON: CPT

## 2019-05-15 PROCEDURE — 70450 CT HEAD/BRAIN W/O DYE: CPT

## 2019-05-15 PROCEDURE — 96372 THER/PROPH/DIAG INJ SC/IM: CPT

## 2019-05-15 PROCEDURE — 71046 X-RAY EXAM CHEST 2 VIEWS: CPT

## 2019-05-15 PROCEDURE — 99284 EMERGENCY DEPT VISIT MOD MDM: CPT

## 2019-05-15 PROCEDURE — 80307 DRUG TEST PRSMV CHEM ANLYZR: CPT | Performed by: EMERGENCY MEDICINE

## 2019-05-15 RX ORDER — KETOROLAC TROMETHAMINE 30 MG/ML
15 INJECTION, SOLUTION INTRAMUSCULAR; INTRAVENOUS ONCE
Status: COMPLETED | OUTPATIENT
Start: 2019-05-15 | End: 2019-05-15

## 2019-05-15 RX ORDER — DICYCLOMINE HCL 20 MG
20 TABLET ORAL ONCE
Status: COMPLETED | OUTPATIENT
Start: 2019-05-15 | End: 2019-05-15

## 2019-05-15 RX ORDER — HALOPERIDOL 5 MG/ML
5 INJECTION INTRAMUSCULAR ONCE
Status: COMPLETED | OUTPATIENT
Start: 2019-05-15 | End: 2019-05-15

## 2019-05-15 RX ORDER — ONDANSETRON 4 MG/1
4 TABLET, ORALLY DISINTEGRATING ORAL EVERY 8 HOURS PRN
Qty: 20 TABLET | Refills: 0 | Status: SHIPPED | OUTPATIENT
Start: 2019-05-15 | End: 2020-07-20 | Stop reason: CLARIF

## 2019-05-15 RX ORDER — OMEPRAZOLE 20 MG/1
20 CAPSULE, DELAYED RELEASE ORAL DAILY
Qty: 30 CAPSULE | Refills: 0 | Status: SHIPPED | OUTPATIENT
Start: 2019-05-15 | End: 2020-07-20 | Stop reason: CLARIF

## 2019-05-15 RX ORDER — DICYCLOMINE HCL 20 MG
20 TABLET ORAL EVERY 8 HOURS PRN
Qty: 12 TABLET | Refills: 0 | Status: SHIPPED | OUTPATIENT
Start: 2019-05-15 | End: 2020-07-20 | Stop reason: CLARIF

## 2019-05-15 RX ORDER — ONDANSETRON 2 MG/ML
4 INJECTION INTRAMUSCULAR; INTRAVENOUS ONCE
Status: COMPLETED | OUTPATIENT
Start: 2019-05-15 | End: 2019-05-15

## 2019-05-15 RX ORDER — ALBUTEROL SULFATE 90 UG/1
2 AEROSOL, METERED RESPIRATORY (INHALATION) ONCE
Status: COMPLETED | OUTPATIENT
Start: 2019-05-15 | End: 2019-05-15

## 2019-05-15 RX ADMIN — KETOROLAC TROMETHAMINE 15 MG: 30 INJECTION, SOLUTION INTRAMUSCULAR at 12:12

## 2019-05-15 RX ADMIN — IOHEXOL 100 ML: 350 INJECTION, SOLUTION INTRAVENOUS at 13:18

## 2019-05-15 RX ADMIN — SODIUM CHLORIDE 1000 ML: 0.9 INJECTION, SOLUTION INTRAVENOUS at 12:19

## 2019-05-15 RX ADMIN — DICYCLOMINE HYDROCHLORIDE 20 MG: 20 TABLET ORAL at 12:11

## 2019-05-15 RX ADMIN — HALOPERIDOL LACTATE 5 MG: 5 INJECTION INTRAMUSCULAR at 12:18

## 2019-05-15 RX ADMIN — ALBUTEROL SULFATE 2 PUFF: 90 AEROSOL, METERED RESPIRATORY (INHALATION) at 15:15

## 2019-05-15 RX ADMIN — ONDANSETRON 4 MG: 2 INJECTION INTRAMUSCULAR; INTRAVENOUS at 12:15

## 2019-05-15 RX ADMIN — SODIUM CHLORIDE 1000 ML: 0.9 INJECTION, SOLUTION INTRAVENOUS at 12:51

## 2019-10-23 ENCOUNTER — HOSPITAL ENCOUNTER (EMERGENCY)
Facility: HOSPITAL | Age: 39
Discharge: HOME | End: 2019-10-23
Attending: EMERGENCY MEDICINE | Admitting: EMERGENCY MEDICINE
Payer: COMMERCIAL

## 2019-10-23 ENCOUNTER — APPOINTMENT (EMERGENCY)
Dept: CT IMAGING | Facility: HOSPITAL | Age: 39
End: 2019-10-23
Payer: COMMERCIAL

## 2019-10-23 VITALS
RESPIRATION RATE: 18 BRPM | BODY MASS INDEX: 30.29 KG/M2 | DIASTOLIC BLOOD PRESSURE: 69 MMHG | SYSTOLIC BLOOD PRESSURE: 145 MMHG | OXYGEN SATURATION: 100 % | WEIGHT: 235.89 LBS | TEMPERATURE: 97.7 F | HEART RATE: 69 BPM

## 2019-10-23 DIAGNOSIS — F12.188 CANNABIS HYPEREMESIS SYNDROME CONCURRENT WITH AND DUE TO CANNABIS ABUSE (HCC): Primary | ICD-10-CM

## 2019-10-23 LAB
ALBUMIN SERPL BCP-MCNC: 4.2 G/DL (ref 3.5–5)
ALP SERPL-CCNC: 79 U/L (ref 46–116)
ALT SERPL W P-5'-P-CCNC: 31 U/L (ref 12–78)
ANION GAP SERPL CALCULATED.3IONS-SCNC: 13 MMOL/L (ref 4–13)
AST SERPL W P-5'-P-CCNC: 19 U/L (ref 5–45)
BASOPHILS # BLD AUTO: 0.08 THOUSANDS/ΜL (ref 0–0.1)
BASOPHILS NFR BLD AUTO: 1 % (ref 0–1)
BILIRUB SERPL-MCNC: 0.6 MG/DL (ref 0.2–1)
BILIRUB UR QL STRIP: NEGATIVE
BUN SERPL-MCNC: 6 MG/DL (ref 5–25)
CALCIUM SERPL-MCNC: 9.8 MG/DL (ref 8.3–10.1)
CHLORIDE SERPL-SCNC: 101 MMOL/L (ref 100–108)
CLARITY UR: CLEAR
CO2 SERPL-SCNC: 24 MMOL/L (ref 21–32)
COLOR UR: YELLOW
CREAT SERPL-MCNC: 0.87 MG/DL (ref 0.6–1.3)
EOSINOPHIL # BLD AUTO: 0.46 THOUSAND/ΜL (ref 0–0.61)
EOSINOPHIL NFR BLD AUTO: 4 % (ref 0–6)
ERYTHROCYTE [DISTWIDTH] IN BLOOD BY AUTOMATED COUNT: 13.4 % (ref 11.6–15.1)
GFR SERPL CREATININE-BSD FRML MDRD: 110 ML/MIN/1.73SQ M
GLUCOSE SERPL-MCNC: 104 MG/DL (ref 65–140)
GLUCOSE UR STRIP-MCNC: NEGATIVE MG/DL
HCT VFR BLD AUTO: 48.5 % (ref 36.5–49.3)
HGB BLD-MCNC: 16.2 G/DL (ref 12–17)
HGB UR QL STRIP.AUTO: NEGATIVE
IMM GRANULOCYTES # BLD AUTO: 0.08 THOUSAND/UL (ref 0–0.2)
IMM GRANULOCYTES NFR BLD AUTO: 1 % (ref 0–2)
KETONES UR STRIP-MCNC: ABNORMAL MG/DL
LEUKOCYTE ESTERASE UR QL STRIP: NEGATIVE
LIPASE SERPL-CCNC: 73 U/L (ref 73–393)
LYMPHOCYTES # BLD AUTO: 2.66 THOUSANDS/ΜL (ref 0.6–4.47)
LYMPHOCYTES NFR BLD AUTO: 22 % (ref 14–44)
MCH RBC QN AUTO: 27.1 PG (ref 26.8–34.3)
MCHC RBC AUTO-ENTMCNC: 33.4 G/DL (ref 31.4–37.4)
MCV RBC AUTO: 81 FL (ref 82–98)
MONOCYTES # BLD AUTO: 0.87 THOUSAND/ΜL (ref 0.17–1.22)
MONOCYTES NFR BLD AUTO: 7 % (ref 4–12)
NEUTROPHILS # BLD AUTO: 8.19 THOUSANDS/ΜL (ref 1.85–7.62)
NEUTS SEG NFR BLD AUTO: 65 % (ref 43–75)
NITRITE UR QL STRIP: NEGATIVE
NRBC BLD AUTO-RTO: 0 /100 WBCS
PH UR STRIP.AUTO: 8.5 [PH]
PLATELET # BLD AUTO: 340 THOUSANDS/UL (ref 149–390)
PMV BLD AUTO: 10.2 FL (ref 8.9–12.7)
POTASSIUM SERPL-SCNC: 3.8 MMOL/L (ref 3.5–5.3)
PROT SERPL-MCNC: 8 G/DL (ref 6.4–8.2)
PROT UR STRIP-MCNC: NEGATIVE MG/DL
RBC # BLD AUTO: 5.97 MILLION/UL (ref 3.88–5.62)
SODIUM SERPL-SCNC: 138 MMOL/L (ref 136–145)
SP GR UR STRIP.AUTO: 1.01 (ref 1–1.03)
UROBILINOGEN UR QL STRIP.AUTO: 0.2 E.U./DL
WBC # BLD AUTO: 12.34 THOUSAND/UL (ref 4.31–10.16)

## 2019-10-23 PROCEDURE — 81003 URINALYSIS AUTO W/O SCOPE: CPT | Performed by: EMERGENCY MEDICINE

## 2019-10-23 PROCEDURE — 96361 HYDRATE IV INFUSION ADD-ON: CPT

## 2019-10-23 PROCEDURE — 99283 EMERGENCY DEPT VISIT LOW MDM: CPT | Performed by: EMERGENCY MEDICINE

## 2019-10-23 PROCEDURE — 96375 TX/PRO/DX INJ NEW DRUG ADDON: CPT

## 2019-10-23 PROCEDURE — 83690 ASSAY OF LIPASE: CPT | Performed by: EMERGENCY MEDICINE

## 2019-10-23 PROCEDURE — 96372 THER/PROPH/DIAG INJ SC/IM: CPT

## 2019-10-23 PROCEDURE — 80053 COMPREHEN METABOLIC PANEL: CPT | Performed by: EMERGENCY MEDICINE

## 2019-10-23 PROCEDURE — 96374 THER/PROPH/DIAG INJ IV PUSH: CPT

## 2019-10-23 PROCEDURE — 36415 COLL VENOUS BLD VENIPUNCTURE: CPT | Performed by: EMERGENCY MEDICINE

## 2019-10-23 PROCEDURE — C9113 INJ PANTOPRAZOLE SODIUM, VIA: HCPCS | Performed by: EMERGENCY MEDICINE

## 2019-10-23 PROCEDURE — 74177 CT ABD & PELVIS W/CONTRAST: CPT

## 2019-10-23 PROCEDURE — 85025 COMPLETE CBC W/AUTO DIFF WBC: CPT | Performed by: EMERGENCY MEDICINE

## 2019-10-23 PROCEDURE — 99284 EMERGENCY DEPT VISIT MOD MDM: CPT

## 2019-10-23 RX ORDER — DIPHENHYDRAMINE HYDROCHLORIDE 50 MG/ML
25 INJECTION INTRAMUSCULAR; INTRAVENOUS ONCE
Status: COMPLETED | OUTPATIENT
Start: 2019-10-23 | End: 2019-10-23

## 2019-10-23 RX ORDER — PANTOPRAZOLE SODIUM 20 MG/1
40 TABLET, DELAYED RELEASE ORAL DAILY
Qty: 20 TABLET | Refills: 0 | Status: SHIPPED | OUTPATIENT
Start: 2019-10-23 | End: 2020-07-20 | Stop reason: CLARIF

## 2019-10-23 RX ORDER — KETOROLAC TROMETHAMINE 30 MG/ML
15 INJECTION, SOLUTION INTRAMUSCULAR; INTRAVENOUS ONCE
Status: COMPLETED | OUTPATIENT
Start: 2019-10-23 | End: 2019-10-23

## 2019-10-23 RX ORDER — ONDANSETRON 4 MG/1
4 TABLET, ORALLY DISINTEGRATING ORAL EVERY 8 HOURS PRN
Qty: 20 TABLET | Refills: 0 | Status: SHIPPED | OUTPATIENT
Start: 2019-10-23 | End: 2020-07-20 | Stop reason: CLARIF

## 2019-10-23 RX ORDER — METOCLOPRAMIDE HYDROCHLORIDE 5 MG/ML
10 INJECTION INTRAMUSCULAR; INTRAVENOUS ONCE
Status: COMPLETED | OUTPATIENT
Start: 2019-10-23 | End: 2019-10-23

## 2019-10-23 RX ORDER — DICYCLOMINE HCL 20 MG
20 TABLET ORAL 2 TIMES DAILY
Qty: 20 TABLET | Refills: 0 | Status: SHIPPED | OUTPATIENT
Start: 2019-10-23 | End: 2020-07-20 | Stop reason: CLARIF

## 2019-10-23 RX ORDER — PANTOPRAZOLE SODIUM 40 MG/1
40 INJECTION, POWDER, FOR SOLUTION INTRAVENOUS ONCE
Status: COMPLETED | OUTPATIENT
Start: 2019-10-23 | End: 2019-10-23

## 2019-10-23 RX ORDER — DICYCLOMINE HCL 20 MG
20 TABLET ORAL ONCE
Status: COMPLETED | OUTPATIENT
Start: 2019-10-23 | End: 2019-10-23

## 2019-10-23 RX ORDER — HALOPERIDOL 5 MG/ML
5 INJECTION INTRAMUSCULAR ONCE
Status: COMPLETED | OUTPATIENT
Start: 2019-10-23 | End: 2019-10-23

## 2019-10-23 RX ORDER — SODIUM CHLORIDE 9 MG/ML
1000 INJECTION, SOLUTION INTRAVENOUS ONCE
Status: COMPLETED | OUTPATIENT
Start: 2019-10-23 | End: 2019-10-23

## 2019-10-23 RX ORDER — ONDANSETRON 2 MG/ML
4 INJECTION INTRAMUSCULAR; INTRAVENOUS ONCE
Status: COMPLETED | OUTPATIENT
Start: 2019-10-23 | End: 2019-10-23

## 2019-10-23 RX ADMIN — DICYCLOMINE HYDROCHLORIDE 20 MG: 20 TABLET ORAL at 15:50

## 2019-10-23 RX ADMIN — IOHEXOL 100 ML: 350 INJECTION, SOLUTION INTRAVENOUS at 16:47

## 2019-10-23 RX ADMIN — PANTOPRAZOLE SODIUM 40 MG: 40 INJECTION, POWDER, FOR SOLUTION INTRAVENOUS at 17:19

## 2019-10-23 RX ADMIN — DIPHENHYDRAMINE HYDROCHLORIDE 25 MG: 50 INJECTION, SOLUTION INTRAMUSCULAR; INTRAVENOUS at 17:20

## 2019-10-23 RX ADMIN — KETOROLAC TROMETHAMINE 15 MG: 30 INJECTION, SOLUTION INTRAMUSCULAR at 15:49

## 2019-10-23 RX ADMIN — SODIUM CHLORIDE 1000 ML/HR: 0.9 INJECTION, SOLUTION INTRAVENOUS at 15:41

## 2019-10-23 RX ADMIN — ONDANSETRON 4 MG: 2 INJECTION INTRAMUSCULAR; INTRAVENOUS at 15:49

## 2019-10-23 RX ADMIN — METOCLOPRAMIDE 10 MG: 5 INJECTION, SOLUTION INTRAMUSCULAR; INTRAVENOUS at 17:20

## 2019-10-23 RX ADMIN — HALOPERIDOL LACTATE 5 MG: 5 INJECTION, SOLUTION INTRAMUSCULAR at 15:49

## 2019-10-23 NOTE — DISCHARGE INSTRUCTIONS
Take zofran as needed for vomiting, bentyl for pain and stop using marijuana as this is very likely contributing to your symptoms

## 2019-10-23 NOTE — ED PROVIDER NOTES
History  Chief Complaint   Patient presents with    Abdominal Pain     c/om abdominal pain starting this morning with vomiting     HPI  43-year-old male past medical history of chronic back pain, marijuana abuse presents to the ED complaining of abdominal pain, nausea that started this morning  He states that the pain is diffuse throughout his abdomen and feels cramping  He states he has not vomited but has had dry heaves  He last smoked marijuana yesterday and ate an edible the day before that  He has been seen in the ED in the past for similar presentation and thought to be secondary to cannabinoid induced hyper emesis  Prior to Admission Medications   Prescriptions Last Dose Informant Patient Reported? Taking? albuterol (PROVENTIL HFA,VENTOLIN HFA) 90 mcg/act inhaler   No No   Sig: Inhale 2 puffs every 4 (four) hours as needed for wheezing   albuterol (PROVENTIL HFA,VENTOLIN HFA) 90 mcg/act inhaler   No No   Sig: Inhale 2 puffs every 4 (four) hours as needed for wheezing   dicyclomine (BENTYL) 20 mg tablet   No No   Sig: Take 1 tablet (20 mg total) by mouth every 8 (eight) hours as needed (abdominal pain or diarrhea)   fluticasone (FLOVENT HFA) 110 MCG/ACT inhaler   No No   Sig: Inhale 1 puff 2 (two) times a day   omeprazole (PriLOSEC) 20 mg delayed release capsule   No No   Sig: Take 1 capsule (20 mg total) by mouth daily   ondansetron (ZOFRAN-ODT) 4 mg disintegrating tablet   No No   Sig: Take 1 tablet (4 mg total) by mouth every 8 (eight) hours as needed for nausea or vomiting      Facility-Administered Medications: None       Past Medical History:   Diagnosis Date    Asthma     Chronic pain     back pain       History reviewed  No pertinent surgical history  History reviewed  No pertinent family history  I have reviewed and agree with the history as documented      Social History     Tobacco Use    Smoking status: Former Smoker     Packs/day: 0 25    Smokeless tobacco: Never Used    Tobacco comment: quit 10/27   Substance Use Topics    Alcohol use: No    Drug use: Yes     Frequency: 1 0 times per week     Types: Marijuana        Review of Systems   Constitutional: Negative for chills and fever  HENT: Negative for dental problem and ear pain  Eyes: Negative for pain and redness  Respiratory: Negative for cough and shortness of breath  Cardiovascular: Negative for chest pain and palpitations  Gastrointestinal: Positive for abdominal pain and nausea  Endocrine: Negative for polydipsia and polyphagia  Genitourinary: Negative for dysuria and frequency  Musculoskeletal: Negative for arthralgias and joint swelling  Skin: Negative for color change and rash  Neurological: Negative for dizziness and headaches  Psychiatric/Behavioral: Negative for behavioral problems and confusion  All other systems reviewed and are negative  Physical Exam  Physical Exam   Constitutional: He is oriented to person, place, and time  He appears well-developed and well-nourished  No distress  HENT:   Head: Atraumatic  Right Ear: External ear normal    Left Ear: External ear normal    Nose: Nose normal    Eyes: Pupils are equal, round, and reactive to light  Conjunctivae and EOM are normal    Neck: Normal range of motion  Neck supple  No JVD present  Cardiovascular: Normal rate, regular rhythm and normal heart sounds  No murmur heard  Pulmonary/Chest: Effort normal and breath sounds normal  No respiratory distress  He has no wheezes  Abdominal: Soft  Bowel sounds are normal  He exhibits no distension  There is generalized tenderness  Musculoskeletal: Normal range of motion  He exhibits no edema  Neurological: He is alert and oriented to person, place, and time  No cranial nerve deficit  Skin: Skin is warm and dry  Capillary refill takes less than 2 seconds  He is not diaphoretic  Psychiatric: He has a normal mood and affect   His behavior is normal    Nursing note and vitals reviewed        Vital Signs  ED Triage Vitals   Temperature Pulse Respirations Blood Pressure SpO2   10/23/19 1519 10/23/19 1519 10/23/19 1519 10/23/19 1522 10/23/19 1519   97 7 °F (36 5 °C) 68 18 136/68 100 %      Temp Source Heart Rate Source Patient Position - Orthostatic VS BP Location FiO2 (%)   10/23/19 1519 10/23/19 1519 10/23/19 1519 10/23/19 1519 --   Oral Monitor Sitting Left arm       Pain Score       10/23/19 1549       Worst Possible Pain           Vitals:    10/23/19 1519 10/23/19 1522 10/23/19 1723   BP:  136/68 145/69   Pulse: 68  69   Patient Position - Orthostatic VS: Sitting  Lying         Visual Acuity      ED Medications  Medications   haloperidol lactate (HALDOL) injection 5 mg (5 mg Intramuscular Given 10/23/19 1549)   dicyclomine (BENTYL) tablet 20 mg (20 mg Oral Given 10/23/19 1550)   ondansetron (ZOFRAN) injection 4 mg (4 mg Intravenous Given 10/23/19 1549)   ketorolac (TORADOL) injection 15 mg (15 mg Intravenous Given 10/23/19 1549)   sodium chloride 0 9 % infusion (0 mL/hr Intravenous Stopped 10/23/19 1719)   iohexol (OMNIPAQUE) 350 MG/ML injection (MULTI-DOSE) 100 mL (100 mL Intravenous Given 10/23/19 1647)   pantoprazole (PROTONIX) injection 40 mg (40 mg Intravenous Given 10/23/19 1719)   metoclopramide (REGLAN) injection 10 mg (10 mg Intravenous Given 10/23/19 1720)   diphenhydrAMINE (BENADRYL) injection 25 mg (25 mg Intravenous Given 10/23/19 1720)       Diagnostic Studies  Results Reviewed     Procedure Component Value Units Date/Time    UA w Reflex to Microscopic w Reflex to Culture [[id number]]  (Abnormal) Collected:  10/23/19 1728    Lab Status:  Final result Specimen:  Urine, Other Updated:  10/23/19 1740     Color, UA Yellow     Clarity, UA Clear     Specific Gravity, UA 1 010     pH, UA 8 5     Leukocytes, UA Negative     Nitrite, UA Negative     Protein, UA Negative mg/dl      Glucose, UA Negative mg/dl      Ketones, UA Trace mg/dl      Urobilinogen, UA 0 2 E U /dl Bilirubin, UA Negative     Blood, UA Negative    Comprehensive metabolic panel [[id number]] Collected:  10/23/19 1539    Lab Status:  Final result Specimen:  Blood from Arm, Right Updated:  10/23/19 1612     Sodium 138 mmol/L      Potassium 3 8 mmol/L      Chloride 101 mmol/L      CO2 24 mmol/L      ANION GAP 13 mmol/L      BUN 6 mg/dL      Creatinine 0 87 mg/dL      Glucose 104 mg/dL      Calcium 9 8 mg/dL      AST 19 U/L      ALT 31 U/L      Alkaline Phosphatase 79 U/L      Total Protein 8 0 g/dL      Albumin 4 2 g/dL      Total Bilirubin 0 60 mg/dL      eGFR 110 ml/min/1 73sq m     Narrative:       [employer] guidelines for Chronic Kidney Disease (CKD):     Stage 1 with normal or high GFR (GFR > 90 mL/min/1 73 square meters)    Stage 2 Mild CKD (GFR = 60-89 mL/min/1 73 square meters)    Stage 3A Moderate CKD (GFR = 45-59 mL/min/1 73 square meters)    Stage 3B Moderate CKD (GFR = 30-44 mL/min/1 73 square meters)    Stage 4 Severe CKD (GFR = 15-29 mL/min/1 73 square meters)    Stage 5 End Stage CKD (GFR <15 mL/min/1 73 square meters)  Note: GFR calculation is accurate only with a steady state creatinine    Lipase [[id number]]  (Normal) Collected:  10/23/19 1539    Lab Status:  Final result Specimen:  Blood from Arm, Right Updated:  10/23/19 1612     Lipase 73 u/L     CBC and differential [[id number]]  (Abnormal) Collected:  10/23/19 1539    Lab Status:  Final result Specimen:  Blood from Arm, Right Updated:  10/23/19 1552     WBC 12 34 Thousand/uL      RBC 5 97 Million/uL      Hemoglobin 16 2 g/dL      Hematocrit 48 5 %      MCV 81 fL      MCH 27 1 pg      MCHC 33 4 g/dL      RDW 13 4 %      MPV 10 2 fL      Platelets 538 Thousands/uL      nRBC 0 /100 WBCs      Neutrophils Relative 65 %      Immat GRANS % 1 %      Lymphocytes Relative 22 %      Monocytes Relative 7 %      Eosinophils Relative 4 %      Basophils Relative 1 %      Neutrophils Absolute 8 19 Thousands/µL      Immature Grans Absolute 0 08 Thousand/uL      Lymphocytes Absolute 2 66 Thousands/µL      Monocytes Absolute 0 87 Thousand/µL      Eosinophils Absolute 0 46 Thousand/µL      Basophils Absolute 0 08 Thousands/µL                  CT abdomen pelvis with contrast   Final Result by Bryn Kothari DO (10/23 1707)      No acute intra-abdominal abnormality  Workstation performed: [id number]                    Procedures  Procedures       ED Course                               MDM  41 yo M presents with abdominal pain and nausea  CT unremarkable, labs mild leukocytosis  Patient comfortable after medications in ED  Symptoms consistent with cannabis hyperemesis syndrome, discussed multiple times importance of stopping marijuana  Disposition  Final diagnoses:   Cannabis hyperemesis syndrome concurrent with and due to cannabis abuse [facility])     Time reflects when diagnosis was documented in both MDM as applicable and the Disposition within this note     Time User Action Codes Description Comment    10/23/2019  5:43 PM Hal  Add [F12 [id number]] Cannabis hyperemesis syndrome concurrent with and due to cannabis abuse [facility])       ED Disposition     ED Disposition Condition Date/Time Comment    Discharge Stable Wed Oct 23, 2019  5:43 PM Lucrecia Jaffe discharge to home/self care              Follow-up Information     Follow up With Specialties Details Why Contact Info    Jerel Vergara MD Internal Medicine Call  for primary care doctor [address] [address]  [phone]            Discharge Medication List as of 10/23/2019  5:44 PM      START taking these medications    Details   !! dicyclomine (BENTYL) 20 mg tablet Take 1 tablet (20 mg total) by mouth 2 (two) times a day, Starting Wed 10/23/2019, Print      !! ondansetron (ZOFRAN-ODT) 4 mg disintegrating tablet Take 1 tablet (4 mg total) by mouth every 8 (eight) hours as needed for nausea, Starting Wed 10/23/2019, Print       !! - Potential duplicate medications found  Please discuss with provider  CONTINUE these medications which have NOT CHANGED    Details   !! albuterol (PROVENTIL HFA,VENTOLIN HFA) 90 mcg/act inhaler Inhale 2 puffs every 4 (four) hours as needed for wheezing, Starting Tue 10/31/2017, Print      !! albuterol (PROVENTIL HFA,VENTOLIN HFA) 90 mcg/act inhaler Inhale 2 puffs every 4 (four) hours as needed for wheezing, Starting Wed 3/20/2019, Print      !! dicyclomine (BENTYL) 20 mg tablet Take 1 tablet (20 mg total) by mouth every 8 (eight) hours as needed (abdominal pain or diarrhea), Starting Wed 5/15/2019, Print      fluticasone (FLOVENT HFA) 110 MCG/ACT inhaler Inhale 1 puff 2 (two) times a day, Starting Tue 10/31/2017, Print      omeprazole (PriLOSEC) 20 mg delayed release capsule Take 1 capsule (20 mg total) by mouth daily, Starting Wed 5/15/2019, Print      !! ondansetron (ZOFRAN-ODT) 4 mg disintegrating tablet Take 1 tablet (4 mg total) by mouth every 8 (eight) hours as needed for nausea or vomiting, Starting Wed 5/15/2019, Print       !! - Potential duplicate medications found  Please discuss with provider  No discharge procedures on file      ED Provider  Electronically Signed by           Trina Moreno MD  10/23/19 0125

## 2020-07-20 ENCOUNTER — HOSPITAL ENCOUNTER (INPATIENT)
Facility: HOSPITAL | Age: 40
LOS: 1 days | Discharge: HOME | DRG: 469 | End: 2020-07-23
Attending: EMERGENCY MEDICINE | Admitting: FAMILY MEDICINE
Payer: COMMERCIAL

## 2020-07-20 ENCOUNTER — APPOINTMENT (EMERGENCY)
Dept: CT IMAGING | Facility: HOSPITAL | Age: 40
DRG: 469 | End: 2020-07-20
Payer: COMMERCIAL

## 2020-07-20 DIAGNOSIS — F41.9 ANXIETY: ICD-10-CM

## 2020-07-20 DIAGNOSIS — F41.0 PANIC ATTACK: ICD-10-CM

## 2020-07-20 DIAGNOSIS — R11.2 NAUSEA AND VOMITING: Primary | ICD-10-CM

## 2020-07-20 DIAGNOSIS — E87.2 LACTIC ACIDOSIS: ICD-10-CM

## 2020-07-20 DIAGNOSIS — R10.13 EPIGASTRIC PAIN: ICD-10-CM

## 2020-07-20 DIAGNOSIS — K52.9 COLITIS: ICD-10-CM

## 2020-07-20 DIAGNOSIS — F12.10 MARIJUANA ABUSE: ICD-10-CM

## 2020-07-20 PROBLEM — J45.909 ASTHMA: Status: ACTIVE | Noted: 2020-07-20

## 2020-07-20 PROBLEM — N17.9 AKI (ACUTE KIDNEY INJURY) (HCC): Status: ACTIVE | Noted: 2020-07-20

## 2020-07-20 PROBLEM — R10.9 ABDOMINAL PAIN: Status: ACTIVE | Noted: 2020-07-20

## 2020-07-20 PROBLEM — N50.9 TESTICULAR ABNORMALITY: Status: ACTIVE | Noted: 2020-07-20

## 2020-07-20 LAB
ALBUMIN SERPL BCP-MCNC: 4.4 G/DL (ref 3.5–5)
ALP SERPL-CCNC: 71 U/L (ref 46–116)
ALT SERPL W P-5'-P-CCNC: 37 U/L (ref 12–78)
AMPHETAMINES SERPL QL SCN: NEGATIVE
ANION GAP SERPL CALCULATED.3IONS-SCNC: 15 MMOL/L (ref 4–13)
APTT PPP: 26 SECONDS (ref 23–37)
AST SERPL W P-5'-P-CCNC: 22 U/L (ref 5–45)
BARBITURATES UR QL: NEGATIVE
BASOPHILS # BLD AUTO: 0.06 THOUSANDS/ΜL (ref 0–0.1)
BASOPHILS NFR BLD AUTO: 1 % (ref 0–1)
BENZODIAZ UR QL: NEGATIVE
BILIRUB SERPL-MCNC: 0.9 MG/DL (ref 0.2–1)
BILIRUB UR QL STRIP: NEGATIVE
BUN SERPL-MCNC: 8 MG/DL (ref 5–25)
CALCIUM SERPL-MCNC: 9.7 MG/DL (ref 8.3–10.1)
CHLORIDE SERPL-SCNC: 103 MMOL/L (ref 100–108)
CLARITY UR: CLEAR
CO2 SERPL-SCNC: 22 MMOL/L (ref 21–32)
COCAINE UR QL: NEGATIVE
COLOR UR: YELLOW
CREAT SERPL-MCNC: 1.14 MG/DL (ref 0.6–1.3)
EOSINOPHIL # BLD AUTO: 0.01 THOUSAND/ΜL (ref 0–0.61)
EOSINOPHIL NFR BLD AUTO: 0 % (ref 0–6)
ERYTHROCYTE [DISTWIDTH] IN BLOOD BY AUTOMATED COUNT: 13.2 % (ref 11.6–15.1)
GFR SERPL CREATININE-BSD FRML MDRD: 81 ML/MIN/1.73SQ M
GLUCOSE SERPL-MCNC: 123 MG/DL (ref 65–140)
GLUCOSE UR STRIP-MCNC: NEGATIVE MG/DL
HCT VFR BLD AUTO: 46.5 % (ref 36.5–49.3)
HGB BLD-MCNC: 15.4 G/DL (ref 12–17)
HGB UR QL STRIP.AUTO: NEGATIVE
IMM GRANULOCYTES # BLD AUTO: 0.05 THOUSAND/UL (ref 0–0.2)
IMM GRANULOCYTES NFR BLD AUTO: 0 % (ref 0–2)
INR PPP: 0.92 (ref 0.84–1.19)
KETONES UR STRIP-MCNC: ABNORMAL MG/DL
LACTATE SERPL-SCNC: 2.2 MMOL/L (ref 0.5–2)
LACTATE SERPL-SCNC: 4.4 MMOL/L (ref 0.5–2)
LEUKOCYTE ESTERASE UR QL STRIP: NEGATIVE
LIPASE SERPL-CCNC: 62 U/L (ref 73–393)
LYMPHOCYTES # BLD AUTO: 1.28 THOUSANDS/ΜL (ref 0.6–4.47)
LYMPHOCYTES NFR BLD AUTO: 10 % (ref 14–44)
MCH RBC QN AUTO: 26.9 PG (ref 26.8–34.3)
MCHC RBC AUTO-ENTMCNC: 33.1 G/DL (ref 31.4–37.4)
MCV RBC AUTO: 81 FL (ref 82–98)
METHADONE UR QL: NEGATIVE
MONOCYTES # BLD AUTO: 0.67 THOUSAND/ΜL (ref 0.17–1.22)
MONOCYTES NFR BLD AUTO: 5 % (ref 4–12)
NEUTROPHILS # BLD AUTO: 11.01 THOUSANDS/ΜL (ref 1.85–7.62)
NEUTS SEG NFR BLD AUTO: 84 % (ref 43–75)
NITRITE UR QL STRIP: NEGATIVE
NRBC BLD AUTO-RTO: 0 /100 WBCS
OPIATES UR QL SCN: NEGATIVE
OXYCODONE+OXYMORPHONE UR QL SCN: NEGATIVE
PCP UR QL: NEGATIVE
PH UR STRIP.AUTO: 8.5 [PH]
PLATELET # BLD AUTO: 359 THOUSANDS/UL (ref 149–390)
PMV BLD AUTO: 10.3 FL (ref 8.9–12.7)
POTASSIUM SERPL-SCNC: 3.6 MMOL/L (ref 3.5–5.3)
PROT SERPL-MCNC: 8 G/DL (ref 6.4–8.2)
PROT UR STRIP-MCNC: NEGATIVE MG/DL
PROTHROMBIN TIME: 12.6 SECONDS (ref 11.6–14.5)
RBC # BLD AUTO: 5.72 MILLION/UL (ref 3.88–5.62)
SODIUM SERPL-SCNC: 140 MMOL/L (ref 136–145)
SP GR UR STRIP.AUTO: 1.01 (ref 1–1.03)
THC UR QL: POSITIVE
TROPONIN I SERPL-MCNC: <0.02 NG/ML
UROBILINOGEN UR QL STRIP.AUTO: 0.2 E.U./DL
WBC # BLD AUTO: 13.08 THOUSAND/UL (ref 4.31–10.16)

## 2020-07-20 PROCEDURE — 84484 ASSAY OF TROPONIN QUANT: CPT | Performed by: EMERGENCY MEDICINE

## 2020-07-20 PROCEDURE — 99285 EMERGENCY DEPT VISIT HI MDM: CPT

## 2020-07-20 PROCEDURE — 96361 HYDRATE IV INFUSION ADD-ON: CPT

## 2020-07-20 PROCEDURE — 85025 COMPLETE CBC W/AUTO DIFF WBC: CPT | Performed by: EMERGENCY MEDICINE

## 2020-07-20 PROCEDURE — 96372 THER/PROPH/DIAG INJ SC/IM: CPT

## 2020-07-20 PROCEDURE — 85730 THROMBOPLASTIN TIME PARTIAL: CPT | Performed by: EMERGENCY MEDICINE

## 2020-07-20 PROCEDURE — 74177 CT ABD & PELVIS W/CONTRAST: CPT

## 2020-07-20 PROCEDURE — C9113 INJ PANTOPRAZOLE SODIUM, VIA: HCPCS | Performed by: PHYSICIAN ASSISTANT

## 2020-07-20 PROCEDURE — 93005 ELECTROCARDIOGRAM TRACING: CPT

## 2020-07-20 PROCEDURE — 83690 ASSAY OF LIPASE: CPT | Performed by: EMERGENCY MEDICINE

## 2020-07-20 PROCEDURE — 81003 URINALYSIS AUTO W/O SCOPE: CPT | Performed by: EMERGENCY MEDICINE

## 2020-07-20 PROCEDURE — 99285 EMERGENCY DEPT VISIT HI MDM: CPT | Performed by: EMERGENCY MEDICINE

## 2020-07-20 PROCEDURE — 99219 PR INITIAL OBSERVATION CARE/DAY 50 MINUTES: CPT | Performed by: PHYSICIAN ASSISTANT

## 2020-07-20 PROCEDURE — 83605 ASSAY OF LACTIC ACID: CPT | Performed by: EMERGENCY MEDICINE

## 2020-07-20 PROCEDURE — 85610 PROTHROMBIN TIME: CPT | Performed by: EMERGENCY MEDICINE

## 2020-07-20 PROCEDURE — 36415 COLL VENOUS BLD VENIPUNCTURE: CPT | Performed by: EMERGENCY MEDICINE

## 2020-07-20 PROCEDURE — 80307 DRUG TEST PRSMV CHEM ANLYZR: CPT | Performed by: PHYSICIAN ASSISTANT

## 2020-07-20 PROCEDURE — 80053 COMPREHEN METABOLIC PANEL: CPT | Performed by: EMERGENCY MEDICINE

## 2020-07-20 PROCEDURE — 96374 THER/PROPH/DIAG INJ IV PUSH: CPT

## 2020-07-20 PROCEDURE — 70450 CT HEAD/BRAIN W/O DYE: CPT

## 2020-07-20 RX ORDER — ALBUTEROL SULFATE 90 UG/1
2 AEROSOL, METERED RESPIRATORY (INHALATION) EVERY 4 HOURS PRN
Status: DISCONTINUED | OUTPATIENT
Start: 2020-07-20 | End: 2020-07-23 | Stop reason: HOSPADM

## 2020-07-20 RX ORDER — ONDANSETRON 2 MG/ML
4 INJECTION INTRAMUSCULAR; INTRAVENOUS ONCE
Status: COMPLETED | OUTPATIENT
Start: 2020-07-20 | End: 2020-07-20

## 2020-07-20 RX ORDER — ACETAMINOPHEN 325 MG/1
650 TABLET ORAL EVERY 6 HOURS PRN
Status: DISCONTINUED | OUTPATIENT
Start: 2020-07-20 | End: 2020-07-23 | Stop reason: HOSPADM

## 2020-07-20 RX ORDER — CIPROFLOXACIN 2 MG/ML
400 INJECTION, SOLUTION INTRAVENOUS ONCE
Status: DISCONTINUED | OUTPATIENT
Start: 2020-07-20 | End: 2020-07-20

## 2020-07-20 RX ORDER — OLANZAPINE 10 MG/1
5 INJECTION, POWDER, LYOPHILIZED, FOR SOLUTION INTRAMUSCULAR ONCE
Status: COMPLETED | OUTPATIENT
Start: 2020-07-20 | End: 2020-07-20

## 2020-07-20 RX ORDER — ACETAMINOPHEN 325 MG/1
650 TABLET ORAL ONCE
Status: DISCONTINUED | OUTPATIENT
Start: 2020-07-20 | End: 2020-07-23 | Stop reason: HOSPADM

## 2020-07-20 RX ORDER — SODIUM CHLORIDE 9 MG/ML
125 INJECTION, SOLUTION INTRAVENOUS CONTINUOUS
Status: DISCONTINUED | OUTPATIENT
Start: 2020-07-20 | End: 2020-07-22

## 2020-07-20 RX ORDER — PANTOPRAZOLE SODIUM 40 MG/1
40 INJECTION, POWDER, FOR SOLUTION INTRAVENOUS EVERY 12 HOURS SCHEDULED
Status: DISCONTINUED | OUTPATIENT
Start: 2020-07-20 | End: 2020-07-23 | Stop reason: HOSPADM

## 2020-07-20 RX ORDER — SUCRALFATE ORAL 1 G/10ML
1000 SUSPENSION ORAL ONCE
Status: COMPLETED | OUTPATIENT
Start: 2020-07-20 | End: 2020-07-20

## 2020-07-20 RX ORDER — ONDANSETRON 2 MG/ML
4 INJECTION INTRAMUSCULAR; INTRAVENOUS EVERY 8 HOURS PRN
Status: DISCONTINUED | OUTPATIENT
Start: 2020-07-20 | End: 2020-07-23 | Stop reason: HOSPADM

## 2020-07-20 RX ORDER — LORAZEPAM 2 MG/ML
0.5 INJECTION INTRAMUSCULAR ONCE
Status: COMPLETED | OUTPATIENT
Start: 2020-07-20 | End: 2020-07-20

## 2020-07-20 RX ADMIN — SODIUM CHLORIDE 1000 ML: 0.9 INJECTION, SOLUTION INTRAVENOUS at 17:13

## 2020-07-20 RX ADMIN — SUCRALFATE 1000 MG: 1 SUSPENSION ORAL at 17:24

## 2020-07-20 RX ADMIN — METRONIDAZOLE 500 MG: 500 INJECTION, SOLUTION INTRAVENOUS at 22:44

## 2020-07-20 RX ADMIN — ONDANSETRON 4 MG: 2 INJECTION INTRAMUSCULAR; INTRAVENOUS at 20:02

## 2020-07-20 RX ADMIN — PANTOPRAZOLE SODIUM 40 MG: 40 INJECTION, POWDER, FOR SOLUTION INTRAVENOUS at 22:15

## 2020-07-20 RX ADMIN — OLANZAPINE 5 MG: 10 INJECTION, POWDER, FOR SOLUTION INTRAMUSCULAR at 17:23

## 2020-07-20 RX ADMIN — SODIUM CHLORIDE 125 ML/HR: 0.9 INJECTION, SOLUTION INTRAVENOUS at 22:11

## 2020-07-20 RX ADMIN — CEFTRIAXONE SODIUM 1000 MG: 10 INJECTION, POWDER, FOR SOLUTION INTRAVENOUS at 22:18

## 2020-07-20 RX ADMIN — LORAZEPAM 0.5 MG: 2 INJECTION INTRAMUSCULAR; INTRAVENOUS at 22:04

## 2020-07-20 RX ADMIN — WATER 2.1 ML: 1 INJECTION INTRAMUSCULAR; INTRAVENOUS; SUBCUTANEOUS at 17:24

## 2020-07-20 RX ADMIN — SODIUM CHLORIDE 1000 ML: 0.9 INJECTION, SOLUTION INTRAVENOUS at 19:27

## 2020-07-20 RX ADMIN — IOHEXOL 100 ML: 350 INJECTION, SOLUTION INTRAVENOUS at 18:08

## 2020-07-20 NOTE — ED PROVIDER NOTES
History  Chief Complaint   Patient presents with    Abdominal Pain     c/o abdominal pain and diarrhea     HPI     Patient is a 53-year-old male that reports to emergency department with achy abdominal pain, multiple episodes of diarrhea, vomiting  He notes the diarrhea has been for the past several days  Abdominal pain is achy, diffuse, worse in the upper abdomen  Patient does appear pale, he was too weak to come into the emergency department and was found laying out front in the entrance writhing around  Patient does report a history of anxiety when he gets issues like this and I do suspect that anxiety is contributing somewhat to his presentation however his lactic acid is 4 4, he is pale and does seem to be dehydrated secondary to nausea, vomiting, diarrhea  No chest pain or shortness of breath  Medical decision makin-year-old male, suspect enteritis, however, given the lactic acidosis, will admit  Prior to Admission Medications   Prescriptions Last Dose Informant Patient Reported? Taking? albuterol (PROVENTIL HFA,VENTOLIN HFA) 90 mcg/act inhaler   No No   Sig: Inhale 2 puffs every 4 (four) hours as needed for wheezing      Facility-Administered Medications: None       Past Medical History:   Diagnosis Date    Asthma     Chronic pain     back pain       History reviewed  No pertinent surgical history  History reviewed  No pertinent family history  I have reviewed and agree with the history as documented      E-Cigarette/Vaping     E-Cigarette/Vaping Substances     Social History     Tobacco Use    Smoking status: Former Smoker     Packs/day: 0 25    Smokeless tobacco: Never Used    Tobacco comment: quit 10/27   Substance Use Topics    Alcohol use: Never     Alcohol/week: 0 0 standard drinks     Frequency: Never     Drinks per session: Patient refused     Binge frequency: Never    Drug use: Yes     Frequency: 1 0 times per week     Types: Marijuana       Review of Systems Gastrointestinal: Positive for abdominal pain, diarrhea, nausea and vomiting  All other systems reviewed and are negative  Physical Exam  Physical Exam   Constitutional: He is oriented to person, place, and time  He appears well-developed and well-nourished  HENT:   Head: Normocephalic and atraumatic  Eyes: Pupils are equal, round, and reactive to light  EOM are normal    Neck: Normal range of motion  Neck supple  Cardiovascular: Normal rate, regular rhythm and normal heart sounds  No murmur heard  Pulmonary/Chest: Effort normal and breath sounds normal  No respiratory distress  He has no wheezes  Abdominal: Soft  Bowel sounds are normal  He exhibits no distension  There is tenderness  Musculoskeletal: Normal range of motion  He exhibits no edema or tenderness  Neurological: He is alert and oriented to person, place, and time  No cranial nerve deficit  Coordination normal    Skin: Skin is warm and dry  He is not diaphoretic  No erythema  Psychiatric: He has a normal mood and affect  His behavior is normal    Nursing note and vitals reviewed        Vital Signs  ED Triage Vitals [07/20/20 1659]   Temperature Pulse Respirations Blood Pressure SpO2   99 °F (37 2 °C) 84 18 161/71 100 %      Temp Source Heart Rate Source Patient Position - Orthostatic VS BP Location FiO2 (%)   Rectal Monitor Sitting Right arm --      Pain Score       3           Vitals:    07/22/20 1228 07/22/20 1237 07/22/20 2332 07/23/20 0800   BP: 125/72 132/83 110/51 140/82   Pulse: 78 82 75 81   Patient Position - Orthostatic VS:  Sitting Lying          Visual Acuity  Visual Acuity      Most Recent Value   L Pupil Size (mm)  2   R Pupil Size (mm)  2   L Pupil Shape  Round   R Pupil Shape  Round          ED Medications  Medications   sodium chloride 0 9 % bolus 1,000 mL (0 mL Intravenous Stopped 7/20/20 2002)   sucralfate (CARAFATE) oral suspension 1,000 mg (1,000 mg Oral Given 7/20/20 1724)   OLANZapine (ZyPREXA) IM injection 5 mg (5 mg Intramuscular Given 7/20/20 1723)   sterile water injection **ADS Override Pull** (2 1 mL  Given 7/20/20 1724)   iohexol (OMNIPAQUE) 350 MG/ML injection (MULTI-DOSE) 100 mL (100 mL Intravenous Given 7/20/20 1808)   sodium chloride 0 9 % bolus 1,000 mL (0 mL Intravenous Stopped 7/20/20 2102)   ondansetron (ZOFRAN) injection 4 mg (4 mg Intravenous Given 7/20/20 2002)   LORazepam (ATIVAN) injection 0 5 mg (0 5 mg Intravenous Given 7/20/20 2204)   levalbuterol (XOPENEX) inhalation solution 0 63 mg (0 63 mg Nebulization Given 7/22/20 1126)       Diagnostic Studies  Results Reviewed     Procedure Component Value Units Date/Time    Clostridium difficile toxin by PCR with EIA [[id number]]  (Normal) Collected:  07/21/20 1605    Lab Status:  Final result Specimen:  Stool from Per Rectum Updated:  07/22/20 0457     C difficile toxin by PCR Negative    Stool Enteric Bacterial Panel by PCR [[id number]]  (Normal) Collected:  07/21/20 1605    Lab Status:  Final result Specimen:  Stool from Rectum Updated:  07/22/20 0455     Salmonella sp PCR None Detected     Shigella sp/Enteroinvasive E  coli (EIEC) PCR None Detected     Campylobacter sp (jejuni and coli) PCR None Detected     Shiga toxin 1/Shiga toxin 2 genes PCR None Detected    Novel Coronavirus (Covid-19),PCR [facility] [[id number]]  (Normal) Collected:  07/21/20 1020    Lab Status:  Final result Specimen:  Nares from Nose Updated:  07/21/20 1211     SARS-CoV-2 Negative    Narrative: The specimen collection materials, transport medium, and/or testing methodology utilized in the production of these test results have been proven to be reliable in a limited validation with an abbreviated program under the Emergency Utilization Authorization provided by the FDA  Testing reported as "Presumptive positive" will be confirmed with secondary testing with a reference laboratory to ensure result accuracy    Clinical caution and judgement should be used with the interpretation of these results with consideration of the clinical impression and other laboratory testing  Testing reported as "Positive" or "Negative" has been proven to be accurate according to standard laboratory validation requirements  All testing is performed with control materials showing appropriate reactivity at standard intervals        Basic metabolic panel [[id number]]  (Abnormal) Collected:  07/21/20 0554    Lab Status:  Final result Specimen:  Blood from Arm, Left Updated:  07/21/20 7739     Sodium 140 mmol/L      Potassium 4 1 mmol/L      Chloride 107 mmol/L      CO2 23 mmol/L      ANION GAP 10 mmol/L      BUN 7 mg/dL      Creatinine 0 96 mg/dL      Glucose 116 mg/dL      Glucose, Fasting 116 mg/dL      Calcium 8 3 mg/dL      eGFR 100 ml/min/1 73sq m     Narrative:       [employer] guidelines for Chronic Kidney Disease (CKD):     Stage 1 with normal or high GFR (GFR > 90 mL/min/1 73 square meters)    Stage 2 Mild CKD (GFR = 60-89 mL/min/1 73 square meters)    Stage 3A Moderate CKD (GFR = 45-59 mL/min/1 73 square meters)    Stage 3B Moderate CKD (GFR = 30-44 mL/min/1 73 square meters)    Stage 4 Severe CKD (GFR = 15-29 mL/min/1 73 square meters)    Stage 5 End Stage CKD (GFR <15 mL/min/1 73 square meters)  Note: GFR calculation is accurate only with a steady state creatinine    CBC (With Platelets) [[id number]]  (Abnormal) Collected:  07/21/20 0554    Lab Status:  Final result Specimen:  Blood from Arm, Left Updated:  07/21/20 0601     WBC 11 11 Thousand/uL      RBC 4 79 Million/uL      Hemoglobin 13 0 g/dL      Hematocrit 39 6 %      MCV 83 fL      MCH 27 1 pg      MCHC 32 8 g/dL      RDW 13 3 %      Platelets 437 Thousands/uL      MPV 10 0 fL     Lactic acid, plasma [[id number]]  (Normal) Collected:  07/21/20 0016    Lab Status:  Final result Specimen:  Blood from Arm, Right Updated:  07/21/20 0041     LACTIC ACID 1 5 mmol/L     Narrative:       Result may be elevated if tourniquet was used during collection  Rapid drug screen, urine [[id number]]  (Abnormal) Collected:  07/20/20 2220    Lab Status:  Final result Specimen:  Urine, Other Updated:  07/20/20 2250     Amph/Meth UR Negative     Barbiturate Ur Negative     Benzodiazepine Urine Negative     Cocaine Urine Negative     Methadone Urine Negative     Opiate Urine Negative     PCP Ur Negative     THC Urine Positive     Oxycodone Urine Negative    Narrative:       Presumptive report  If requested, specimen will be sent to reference lab for confirmation  FOR MEDICAL PURPOSES ONLY  IF CONFIRMATION NEEDED PLEASE CONTACT THE LAB WITHIN 5 DAYS  Drug Screen Cutoff Levels:  AMPHETAMINE/METHAMPHETAMINES  1000 ng/mL  BARBITURATES     200 ng/mL  BENZODIAZEPINES     200 ng/mL  COCAINE      300 ng/mL  METHADONE      300 ng/mL  OPIATES      300 ng/mL  PHENCYCLIDINE     25 ng/mL  THC       50 ng/mL  OXYCODONE      100 ng/mL    Lactic acid 2 Hours [[id number]]  (Abnormal) Collected:  07/20/20 2107    Lab Status:  Final result Specimen:  Blood from Arm, Left Updated:  07/20/20 2140     LACTIC ACID 2 2 mmol/L     Narrative:       Result may be elevated if tourniquet was used during collection  UA w Reflex to Microscopic w Reflex to Culture [[id number]]  (Abnormal) Collected:  07/20/20 2011    Lab Status:  Final result Specimen:  Urine, Other Updated:  07/20/20 2024     Color, UA Yellow     Clarity, UA Clear     Specific Gravity, UA 1 010     pH, UA 8 5     Leukocytes, UA Negative     Nitrite, UA Negative     Protein, UA Negative mg/dl      Glucose, UA Negative mg/dl      Ketones, UA 15 (1+) mg/dl      Urobilinogen, UA 0 2 E U /dl      Bilirubin, UA Negative     Blood, UA Negative    Lactic acid [[id number]]  (Abnormal) Collected:  07/20/20 1710    Lab Status:  Final result Specimen:  Blood from Arm, Left Updated:  07/20/20 1857     LACTIC ACID 4 4 mmol/L     Narrative:       Result may be elevated if tourniquet was used during collection      Troponin I [[id number]]  (Normal) Collected:  07/20/20 1710    Lab Status:  Final result Specimen:  Blood from Arm, Left Updated:  07/20/20 1742     Troponin I <0 02 ng/mL     Comprehensive metabolic panel [[id number]]  (Abnormal) Collected:  07/20/20 1710    Lab Status:  Final result Specimen:  Blood from Arm, Left Updated:  07/20/20 1740     Sodium 140 mmol/L      Potassium 3 6 mmol/L      Chloride 103 mmol/L      CO2 22 mmol/L      ANION GAP 15 mmol/L      BUN 8 mg/dL      Creatinine 1 14 mg/dL      Glucose 123 mg/dL      Calcium 9 7 mg/dL      AST 22 U/L      ALT 37 U/L      Alkaline Phosphatase 71 U/L      Total Protein 8 0 g/dL      Albumin 4 4 g/dL      Total Bilirubin 0 90 mg/dL      eGFR 81 ml/min/1 73sq m     Narrative:       [place] guidelines for Chronic Kidney Disease (CKD):     Stage 1 with normal or high GFR (GFR > 90 mL/min/1 73 square meters)    Stage 2 Mild CKD (GFR = 60-89 mL/min/1 73 square meters)    Stage 3A Moderate CKD (GFR = 45-59 mL/min/1 73 square meters)    Stage 3B Moderate CKD (GFR = 30-44 mL/min/1 73 square meters)    Stage 4 Severe CKD (GFR = 15-29 mL/min/1 73 square meters)    Stage 5 End Stage CKD (GFR <15 mL/min/1 73 square meters)  Note: GFR calculation is accurate only with a steady state creatinine    Lipase [[id number]]  (Abnormal) Collected:  07/20/20 1710    Lab Status:  Final result Specimen:  Blood from Arm, Left Updated:  07/20/20 1740     Lipase 62 u/L     Protime-INR [[id number]]  (Normal) Collected:  07/20/20 1710    Lab Status:  Final result Specimen:  Blood from Arm, Left Updated:  07/20/20 1734     Protime 12 6 seconds      INR 0 92    APTT [[id number]]  (Normal) Collected:  07/20/20 1710    Lab Status:  Final result Specimen:  Blood from Arm, Left Updated:  07/20/20 1734     PTT 26 seconds     CBC and differential [[id number]]  (Abnormal) Collected:  07/20/20 1710    Lab Status:  Final result Specimen:  Blood from Arm, Left Updated:  07/20/20 1722 WBC 13 08 Thousand/uL      RBC 5 72 Million/uL      Hemoglobin 15 4 g/dL      Hematocrit 46 5 %      MCV 81 fL      MCH 26 9 pg      MCHC 33 1 g/dL      RDW 13 2 %      MPV 10 3 fL      Platelets 549 Thousands/uL      nRBC 0 /100 WBCs      Neutrophils Relative 84 %      Immat GRANS % 0 %      Lymphocytes Relative 10 %      Monocytes Relative 5 %      Eosinophils Relative 0 %      Basophils Relative 1 %      Neutrophils Absolute 11 01 Thousands/µL      Immature Grans Absolute 0 05 Thousand/uL      Lymphocytes Absolute 1 28 Thousands/µL      Monocytes Absolute 0 67 Thousand/µL      Eosinophils Absolute 0 01 Thousand/µL      Basophils Absolute 0 06 Thousands/µL                  CT abdomen pelvis wo contrast   Final Result by Hanna Orellana MD (07/22 1500)      No pneumoperitoneum is seen   No bowel obstruction   2         Workstation performed: [id number]         US right upper quadrant   Final Result by Kenneth Vanessa MD (07/22 4241)      Mild hepatomegaly  Otherwise unremarkable exam       Workstation performed: [id number]         US scrotum and testicles   Final Result by Shyanne Murry DO (07/21 3785)   Unremarkable exam   No testicular or epididymal mass identified  Workstation performed: [id number]         CT head without contrast   Final Result by Tomeka Marroquin MD (07/20 1831)      No acute intracranial abnormality  Workstation performed: [id number]         CT abdomen pelvis with contrast   Final Result by Terence Nguyen MD (07/20 1836)      Wall thickening of the transverse colon and descending colon could relate to underdistention or colitis        Increased soft tissue density in the right testicular region, correlate with testicular ultrasound            Workstation performed: [id number]                    Procedures  Procedures         ED Course  ED Course as of Jul 23 1624 Mon Jul 20, 2020   2106 obs      2122 Discussed the scan with the patient  "Increased soft tissue density in the right testicular region, correlate with testicular ultrasound"                                                MDM      Disposition  Final diagnoses:   Nausea and vomiting   Colitis   Lactic acidosis     Time reflects when diagnosis was documented in both MDM as applicable and the Disposition within this note     Time User Action Codes Description Comment    7/20/2020  9:16  Critical access hospital, [address] [R11 2] Nausea and vomiting     7/20/2020  9:16  Critical access hospital, [address] [K52 9] Colitis     7/20/2020  9:16  Critical access hospital, [address] [E87 2] Lactic acidosis     7/20/2020  9:55 PM Artemio Kaylah Add [F12 10] Marijuana abuse     7/21/2020 10:05 AM HemalLeeanne Grant Add [R10 13] Epigastric pain     7/21/2020 10:05 AM HemalLeeanne Grant Modify [R10 13] Epigastric pain     7/22/2020  1:02 PM Scheherrera Fried Add [F41 0] Panic attack     7/22/2020  1:02 PM Scheherrera Barbosa Add [F41 9] Anxiety       ED Disposition     ED Disposition Condition Date/Time Comment    Admit Stable Mon Jul 20, 2020  9:16 PM Case was discussed with slim ap and the patient's admission status was agreed to be Admission Status: observation status to the service of Dr Selvin Salmon     Follow up With Specialties Details Why Contact Info    Julio Aguilar MD Family Medicine Call in 1 week(s)  [address] [facility] [address] [facility]      Eileen Mcghee DO Gastroenterology Call in 1 week(s)  [address] [address] [facility] [address] [facility]            Discharge Medication List as of 7/23/2020 10:31 AM      START taking these medications    Details   dicyclomine (BENTYL) 10 mg capsule Take 1 capsule (10 mg total) by mouth every 6 (six) hours as needed (abdominal cramps), Starting Thu 7/23/2020, Normal      escitalopram (LEXAPRO) 10 mg tablet Take 1 tablet (10 mg total) by mouth daily, Starting Thu 7/23/2020, Normal      pantoprazole (PROTONIX) 40 mg tablet Take 1 tablet (40 mg total) by mouth daily, Starting Thu 7/23/2020, Normal         CONTINUE these medications which have NOT CHANGED    Details   albuterol (PROVENTIL HFA,VENTOLIN HFA) 90 mcg/act inhaler Inhale 2 puffs every 4 (four) hours as needed for wheezing, Starting Tue 10/31/2017, Print           Outpatient Discharge Orders   Discharge Diet     Activity as tolerated       PDMP Review     None          ED Provider  Electronically Signed by           Umesh Duval MD  07/23/20 1936

## 2020-07-21 ENCOUNTER — APPOINTMENT (OUTPATIENT)
Dept: ULTRASOUND IMAGING | Facility: HOSPITAL | Age: 40
DRG: 469 | End: 2020-07-21
Payer: COMMERCIAL

## 2020-07-21 LAB
ANION GAP SERPL CALCULATED.3IONS-SCNC: 10 MMOL/L (ref 4–13)
BUN SERPL-MCNC: 7 MG/DL (ref 5–25)
CALCIUM SERPL-MCNC: 8.3 MG/DL (ref 8.3–10.1)
CHLORIDE SERPL-SCNC: 107 MMOL/L (ref 100–108)
CO2 SERPL-SCNC: 23 MMOL/L (ref 21–32)
CREAT SERPL-MCNC: 0.96 MG/DL (ref 0.6–1.3)
ERYTHROCYTE [DISTWIDTH] IN BLOOD BY AUTOMATED COUNT: 13.3 % (ref 11.6–15.1)
GFR SERPL CREATININE-BSD FRML MDRD: 100 ML/MIN/1.73SQ M
GLUCOSE P FAST SERPL-MCNC: 116 MG/DL (ref 65–99)
GLUCOSE SERPL-MCNC: 116 MG/DL (ref 65–140)
HCT VFR BLD AUTO: 39.6 % (ref 36.5–49.3)
HGB BLD-MCNC: 13 G/DL (ref 12–17)
LACTATE SERPL-SCNC: 1.5 MMOL/L (ref 0.5–2)
MCH RBC QN AUTO: 27.1 PG (ref 26.8–34.3)
MCHC RBC AUTO-ENTMCNC: 32.8 G/DL (ref 31.4–37.4)
MCV RBC AUTO: 83 FL (ref 82–98)
PLATELET # BLD AUTO: 269 THOUSANDS/UL (ref 149–390)
PMV BLD AUTO: 10 FL (ref 8.9–12.7)
POTASSIUM SERPL-SCNC: 4.1 MMOL/L (ref 3.5–5.3)
RBC # BLD AUTO: 4.79 MILLION/UL (ref 3.88–5.62)
SARS-COV-2 RNA RESP QL NAA+PROBE: NEGATIVE
SODIUM SERPL-SCNC: 140 MMOL/L (ref 136–145)
WBC # BLD AUTO: 11.11 THOUSAND/UL (ref 4.31–10.16)

## 2020-07-21 PROCEDURE — 36415 COLL VENOUS BLD VENIPUNCTURE: CPT | Performed by: PHYSICIAN ASSISTANT

## 2020-07-21 PROCEDURE — 80048 BASIC METABOLIC PNL TOTAL CA: CPT | Performed by: PHYSICIAN ASSISTANT

## 2020-07-21 PROCEDURE — 87505 NFCT AGENT DETECTION GI: CPT | Performed by: PHYSICIAN ASSISTANT

## 2020-07-21 PROCEDURE — 87493 C DIFF AMPLIFIED PROBE: CPT | Performed by: PHYSICIAN ASSISTANT

## 2020-07-21 PROCEDURE — 76870 US EXAM SCROTUM: CPT

## 2020-07-21 PROCEDURE — 99226 PR SBSQ OBSERVATION CARE/DAY 35 MINUTES: CPT | Performed by: FAMILY MEDICINE

## 2020-07-21 PROCEDURE — 85027 COMPLETE CBC AUTOMATED: CPT | Performed by: PHYSICIAN ASSISTANT

## 2020-07-21 PROCEDURE — 76705 ECHO EXAM OF ABDOMEN: CPT

## 2020-07-21 PROCEDURE — 99215 OFFICE O/P EST HI 40 MIN: CPT | Performed by: INTERNAL MEDICINE

## 2020-07-21 PROCEDURE — 83605 ASSAY OF LACTIC ACID: CPT | Performed by: PHYSICIAN ASSISTANT

## 2020-07-21 PROCEDURE — C9113 INJ PANTOPRAZOLE SODIUM, VIA: HCPCS | Performed by: PHYSICIAN ASSISTANT

## 2020-07-21 PROCEDURE — 87635 SARS-COV-2 COVID-19 AMP PRB: CPT | Performed by: PHYSICIAN ASSISTANT

## 2020-07-21 RX ADMIN — METRONIDAZOLE 500 MG: 500 INJECTION, SOLUTION INTRAVENOUS at 05:55

## 2020-07-21 RX ADMIN — SODIUM CHLORIDE 125 ML/HR: 0.9 INJECTION, SOLUTION INTRAVENOUS at 07:10

## 2020-07-21 RX ADMIN — ALBUTEROL SULFATE 2 PUFF: 90 AEROSOL, METERED RESPIRATORY (INHALATION) at 10:30

## 2020-07-21 RX ADMIN — PANTOPRAZOLE SODIUM 40 MG: 40 INJECTION, POWDER, FOR SOLUTION INTRAVENOUS at 09:11

## 2020-07-21 RX ADMIN — PANTOPRAZOLE SODIUM 40 MG: 40 INJECTION, POWDER, FOR SOLUTION INTRAVENOUS at 21:41

## 2020-07-21 NOTE — ASSESSMENT & PLAN NOTE
· Patient reports history of anxiety, however he is not prescribed any medication for this outpatient  · Patient with severe anxiety currently  · To be given 0 5 mg Ativan in the ED  · Obtain UDS  · Monitor mood closely

## 2020-07-21 NOTE — H&P
H&Zulay Emmanuel 1980, 47 y o  male MRN: [id number]    Unit/Bed#: ED 17 Encounter: [id number]    Primary Care Provider: No primary care provider on file  Date and time admitted to hospital: 7/20/2020  4:57 PM      DOS: 7/20/2020    * Abdominal pain  Assessment & Plan  · Pt presenting with epigastric abdominal pain, N/V/D x3 days  · Has been to the ER multiple times for marijuana related cyclical vomiting syndrome  · CT scan with evidence of wall thickening of the transverse colon and descending colon relating to either underdistention or colitis  · Symptoms therefore likely multifactorial as above  · Lipase negative   · Place on IV ceftriaxone and Flagyl in setting of lactic acidosis and leukocytosis to cover for infectious colitis  · NPO  · Aggressive IV fluid hydration  · Placed on Protonix, consider addition of Bentyl  · GI consultation as this appears to be a recurrent issue for the patient  · Encourage marijuana cessation  · Obtain enteric stool and c  Diff PCR for now, however low suspicion at this time    Asthma  Assessment & Plan  · Mild intermittent  · Continue p r n  Albuterol  · Not currently in acute exacerbation    Panic attack  Assessment & Plan  · Patient reports history of anxiety, however he is not prescribed any medication for this outpatient  · Patient with severe anxiety currently  · To be given 0 5 mg Ativan in the ED  · Obtain UDS  · Monitor mood closely     Testicular abnormality  Assessment & Plan  · Incidental finding noted on CT scan - increased soft tissue density in the right testicular region  · Would recommend testicular ultrasound for further evaluation    ISAURO (acute kidney injury) ([facility] [address] )  Assessment & Plan  · POA, creatinine 1 14  · Baseline creatinine appears to be around 0 8 on review  · Likely in setting of dehydration and GI loss  · Continue IV fluid hydration  · Monitor BMP in the a m    · Avoid nephrotoxic agents and hypotension    Tobacco abuse  Assessment & Plan  · Reports quitting cigarettes, does chew tobacco  · Placed on nicotine patch here  · Encouraged cessation    Marijuana abuse  Assessment & Plan  · Patient reports smoking marijuana frequently at home  · Last use was a few days ago  · Encouraged cessation  · Could be contributing to patient's vomiting symptoms    Leukocytosis  Assessment & Plan  · POA, WBC 13 08  · Appears to be chronic on review of records  · Could be reactive secondary to dehydration/nausea and vomiting  · However due to CT findings of colitis, will placed on empiric IV ceftriaxone and Flagyl for now  · Obtain CBC in the a m  Lactic acidosis  Assessment & Plan  · POA, lactic 4 4  · Likely secondary to dehydration due to GI loss  · Repeat significantly improved at 2 2  · Continue IV fluid hydration  · Obtain repeat lactic Q 2 hours until normalized    VTE Prophylaxis: Patient low risk, ambulate as able  / sequential compression device   Code Status:  Level 1-full code  POLST: There is no POLST form on file for this patient (pre-hospital)  Discussion with family:  Discussed with patient at bedside regarding plan of care    Anticipated Length of Stay:  Patient will be admitted on an Observation basis with an anticipated length of stay of  < 2 midnights  Justification for Hospital Stay:  Patient with abdominal pain, intractable nausea and vomiting requiring GI consultation, IV fluids and IV antibiotics    Total Time for Visit, including Counseling / Coordination of Care: 30 minutes  Greater than 50% of this total time spent on direct patient counseling and coordination of care  Chief Complaint:   "My stomach hurts so much, help me, I can't do it, help me "    History of Present Illness:    Alexander Ashford is a 47 y o  male with significant past medical history of asthma, tobacco abuse, marijuana use who presents with abdominal pain, nausea and vomiting x3 days  Patient is extremely anxious during my encounter    He reports that he is having an anxiety attack  States that he does have anxiety at home but does not typically take medications for it  Reports that he just wants to take a hot shower and that his abdominal pain is severe right now  He reports that the abdominal pain started a few days ago but today it got much worse  It is located in the epigastric region  He also reports nausea and vomiting in addition to significant diarrhea  He does report smoking marijuana and last use was also a few days ago which he describes as a small amount  He also reports that he had a sip of alcohol last night and does have history of chewing tobacco   He reports that the only medication he is on at home is his albuterol inhaler  Was previously prescribed GI medications including Bentyl, Protonix, omeprazole but he reports that he has not followed up to get refills on these prescriptions  He does not take any medications on a daily basis  He was also reporting chills  Review of Systems:    Review of Systems   Constitutional: Positive for appetite change and chills  Negative for diaphoresis and fever  HENT: Negative for congestion, sneezing, sore throat, tinnitus and trouble swallowing  Eyes: Negative  Negative for visual disturbance  Respiratory: Negative for cough, chest tightness, shortness of breath and wheezing  Cardiovascular: Negative for chest pain, palpitations and leg swelling  Gastrointestinal: Positive for abdominal pain, diarrhea, nausea and vomiting  Negative for blood in stool and constipation  Genitourinary: Negative for difficulty urinating, dysuria, hematuria and urgency  Musculoskeletal: Positive for myalgias  Negative for back pain, gait problem and joint swelling  Skin: Negative for color change, pallor and rash  Neurological: Negative for dizziness, syncope, light-headedness and headaches         Past Medical and Surgical History:     Past Medical History:   Diagnosis Date    Asthma     Chronic pain     back pain       History reviewed  No pertinent surgical history  Meds/Allergies:    Prior to Admission medications    Medication Sig Start Date End Date Taking? Authorizing Provider   albuterol (PROVENTIL HFA,VENTOLIN HFA) 90 mcg/act inhaler Inhale 2 puffs every 4 (four) hours as needed for wheezing 10/31/17   Paarg Cisneros MD   albuterol (PROVENTIL HFA,VENTOLIN HFA) 90 mcg/act inhaler Inhale 2 puffs every 4 (four) hours as needed for wheezing 3/20/19   Kimberli Bucio, DO   dicyclomine (BENTYL) 20 mg tablet Take 1 tablet (20 mg total) by mouth every 8 (eight) hours as needed (abdominal pain or diarrhea) 5/15/19   Santiago Meehan, DO   dicyclomine (BENTYL) 20 mg tablet Take 1 tablet (20 mg total) by mouth 2 (two) times a day 10/23/19   Deya Nolan MD   fluticasone (FLOVENT HFA) 110 MCG/ACT inhaler Inhale 1 puff 2 (two) times a day 10/31/17   Parag Cisneros MD   omeprazole (PriLOSEC) 20 mg delayed release capsule Take 1 capsule (20 mg total) by mouth daily 5/15/19   Scarlet Wesley, DO   ondansetron (ZOFRAN-ODT) 4 mg disintegrating tablet Take 1 tablet (4 mg total) by mouth every 8 (eight) hours as needed for nausea or vomiting 5/15/19   Santiago Meehan, DO   ondansetron (ZOFRAN-ODT) 4 mg disintegrating tablet Take 1 tablet (4 mg total) by mouth every 8 (eight) hours as needed for nausea 10/23/19   Deya Nolan MD   pantoprazole (PROTONIX) 20 mg tablet Take 2 tablets (40 mg total) by mouth daily 10/23/19   Deya Nolan MD     I have reviewed home medications with patient personally  Allergies:    Allergies   Allergen Reactions    Advil [Ibuprofen] Hives       Social History:     Marital Status: Significant Other   Occupation:   Patient Pre-hospital Living Situation:  Patient lives at home  Patient Pre-hospital Level of Mobility:  Independent  Patient Pre-hospital Diet Restrictions:  None  Substance Use History:   Social History     Substance and Sexual Activity   Alcohol Use No     Social History Tobacco Use   Smoking Status Former Smoker    Packs/day: 0 25   Smokeless Tobacco Never Used   Tobacco Comment    quit 10/27     Social History     Substance and Sexual Activity   Drug Use Yes    Frequency: 1 0 times per week    Types: Marijuana       Family History:    non-contributory    Physical Exam:     Vitals:   Blood Pressure: 161/71 (07/20/20 1659)  Pulse: 84 (07/20/20 1659)  Temperature: 99 °F (37 2 °C) (07/20/20 1659)  Temp Source: Rectal (07/20/20 1659)  Respirations: 18 (07/20/20 1659)  SpO2: 100 % (07/20/20 1700)    Physical Exam   Constitutional: He appears distressed  Patient is in distress, he is writhing around on his hospital bed yelling out in pain  He is extremely anxious, likely anxiety attack  Intermittently calms down during auscultation, would not allow for an adequate abdominal exam    HENT:   Head: Normocephalic and atraumatic  Eyes: Pupils are equal, round, and reactive to light  Conjunctivae are normal    Cardiovascular: Normal rate, regular rhythm and intact distal pulses  Pulmonary/Chest: Effort normal and breath sounds normal  No stridor  No respiratory distress  He has no wheezes  Abdominal: Soft  Bowel sounds are normal  He exhibits no distension  There is tenderness (generalized to palpation)  Pt continuously moving around in bed, difficult to assess abdominal exam   Musculoskeletal: He exhibits no edema  Neurological: He is alert  Skin: Skin is warm and dry  He is not diaphoretic  No erythema  Vitals reviewed  Additional Data:     Lab Results: I have personally reviewed pertinent reports        Results from last 7 days   Lab Units 07/20/20  1710   WBC Thousand/uL 13 08*   HEMOGLOBIN g/dL 15 4   HEMATOCRIT % 46 5   PLATELETS Thousands/uL 359   NEUTROS PCT % 84*   LYMPHS PCT % 10*   MONOS PCT % 5   EOS PCT % 0     Results from last 7 days   Lab Units 07/20/20  1710   SODIUM mmol/L 140   POTASSIUM mmol/L 3 6   CHLORIDE mmol/L 103   CO2 mmol/L 22   BUN mg/dL 8   CREATININE mg/dL 1 14   ANION GAP mmol/L 15*   CALCIUM mg/dL 9 7   ALBUMIN g/dL 4 4   TOTAL BILIRUBIN mg/dL 0 90   ALK PHOS U/L 71   ALT U/L 37   AST U/L 22   GLUCOSE RANDOM mg/dL 123     Results from last 7 days   Lab Units 07/20/20  1710   INR  0 92             Results from last 7 days   Lab Units 07/20/20  2107 07/20/20  1710   LACTIC ACID mmol/L 2 2* 4 4*       Imaging: I have personally reviewed pertinent reports  CT head without contrast   Final Result by John Diaz MD (07/20 1831)      No acute intracranial abnormality  Workstation performed: [id number]         CT abdomen pelvis with contrast   Final Result by August Crisostomo MD (07/20 1836)      Wall thickening of the transverse colon and descending colon could relate to underdistention or colitis  Increased soft tissue density in the right testicular region, correlate with testicular ultrasound            Workstation performed: [id number]             EKG, Pathology, and Other Studies Reviewed on Admission:   · EKG: sinus anita  · CT scan results reviewed     [facility] Yes     ** Please Note: This note has been constructed using a voice recognition system   **

## 2020-07-21 NOTE — ASSESSMENT & PLAN NOTE
· Incidental finding noted on CT scan - increased soft tissue density in the right testicular region  · Would recommend testicular ultrasound for further evaluation

## 2020-07-21 NOTE — ASSESSMENT & PLAN NOTE
· Patient reports smoking marijuana frequently at home  · Last use was a few days ago  · Encouraged cessation  · Could be contributing to patient's vomiting symptoms

## 2020-07-21 NOTE — ASSESSMENT & PLAN NOTE
· Pt presenting with epigastric abdominal pain, N/V/D x3 days  · Has been to the ER multiple times for marijuana related cyclical vomiting syndrome  · CT scan with evidence of wall thickening of the transverse colon and descending colon relating to either underdistention or colitis  · Symptoms therefore likely multifactorial as above  · Lipase negative   · Place on IV ceftriaxone and Flagyl in setting of lactic acidosis and leukocytosis to cover for colitis  · NPO  · Aggressive IV fluid hydration  · Placed on Protonix, consider addition of Bentyl  · GI consultation as this appears to be a recurrent issue for the patient  · Encourage marijuana cessation

## 2020-07-21 NOTE — ASSESSMENT & PLAN NOTE
· POA, WBC is around 11 and improving  · Appears to be chronic on review of records  · Could be reactive secondary to dehydration/nausea and vomiting  · However due to CT findings of colitis, continue on empiric IV ceftriaxone and Flagyl for now  · Obtain CBC in the a m

## 2020-07-21 NOTE — ED NOTES
1  CC-N/V/D abd pain DX colitis     2  Orientation status-A&Ox4    3  Abnormal labs/ abnormal focused assessment/ abnormal vitals-elevated WBC and lactic acid (that trended back to normal) urine positive for THC  Pt tolerating clear liquids    4  Medications/drips-0 9%normal [email or web address]    5  Last time narcotics given-n/a    6  IV lines/drains/etc-20G 1and3/4 inch in L forearm    7  Isolation status-r/t c diff (contact) pt unable to provide enough stool for culture    8  Skin-intact    9  Ambulation-independent    10  ED nurse's phone number-[phone]  11   Admission related to traumatic injury-no           Alex Machuca RN  07/21/20 8736

## 2020-07-21 NOTE — ASSESSMENT & PLAN NOTE
· POA, creatinine 1 14  It is currently improved at 0 9  · Baseline creatinine appears to be around 0 8 on review  · Likely in setting of dehydration and GI loss  · Continue IV fluid hydration  · Monitor BMP in the a m    · Avoid nephrotoxic agents and hypotension

## 2020-07-21 NOTE — ASSESSMENT & PLAN NOTE
· POA, creatinine 1 14  · Baseline creatinine appears to be around 0 8 on review  · Likely in setting of dehydration and GI loss  · Continue IV fluid hydration  · Monitor BMP in the a m    · Avoid nephrotoxic agents and hypotension

## 2020-07-21 NOTE — ASSESSMENT & PLAN NOTE
· Patient reports smoking marijuana frequently at home  · Last use was a few days ago    · Could be contributing to patient's vomiting symptoms

## 2020-07-21 NOTE — ASSESSMENT & PLAN NOTE
· POA, WBC 13 08  · Appears to be chronic on review of records  · Could be reactive secondary to dehydration/nausea and vomiting  · However due to CT findings of colitis, will placed on empiric IV ceftriaxone and Flagyl for now  · Obtain CBC in the a m

## 2020-07-21 NOTE — PLAN OF CARE
Problem: Potential for Falls  Goal: Patient will remain free of falls  Description  INTERVENTIONS:  - Assess patient frequently for physical needs  -  Identify cognitive and physical deficits and behaviors that affect risk of falls    -  [place] fall precautions as indicated by assessment   - Educate patient/family on patient safety including physical limitations  - Instruct patient to call for assistance with activity based on assessment  - Modify environment to reduce risk of injury  - Consider OT/PT consult to assist with strengthening/mobility  Outcome: Progressing     Problem: PAIN - ADULT  Goal: Verbalizes/displays adequate comfort level or baseline comfort level  Description  Interventions:  - Encourage patient to monitor pain and request assistance  - Assess pain using appropriate pain scale  - Administer analgesics based on type and severity of pain and evaluate response  - Implement non-pharmacological measures as appropriate and evaluate response  - Consider cultural and social influences on pain and pain management  - Notify physician/advanced practitioner if interventions unsuccessful or patient reports new pain  Outcome: Progressing     Problem: INFECTION - ADULT  Goal: Absence or prevention of progression during hospitalization  Description  INTERVENTIONS:  - Assess and monitor for signs and symptoms of infection  - Monitor lab/diagnostic results  - Monitor all insertion sites, i e  indwelling lines, tubes, and drains  - Monitor endotracheal if appropriate and nasal secretions for changes in amount and color  - [place] appropriate cooling/warming therapies per order  - Administer medications as ordered  - Instruct and encourage patient and family to use good hand hygiene technique  - Identify and instruct in appropriate isolation precautions for identified infection/condition  Outcome: Progressing  Goal: Absence of fever/infection during neutropenic period  Description  INTERVENTIONS:  - Monitor WBC    Outcome: Progressing     Problem: SAFETY ADULT  Goal: Maintain or return to baseline ADL function  Description  INTERVENTIONS:  -  Assess patient's ability to carry out ADLs; assess patient's baseline for ADL function and identify physical deficits which impact ability to perform ADLs (bathing, care of mouth/teeth, toileting, grooming, dressing, etc )  - Assess/evaluate cause of self-care deficits   - Assess range of motion  - Assess patient's mobility; develop plan if impaired  - Assess patient's need for assistive devices and provide as appropriate  - Encourage maximum independence but intervene and supervise when necessary  - Involve family in performance of ADLs  - Assess for home care needs following discharge   - Consider OT consult to assist with ADL evaluation and planning for discharge  - Provide patient education as appropriate  Outcome: Progressing  Goal: Maintain or return mobility status to optimal level  Description  INTERVENTIONS:  - Assess patient's baseline mobility status (ambulation, transfers, stairs, etc )    - Identify cognitive and physical deficits and behaviors that affect mobility  - Identify mobility aids required to assist with transfers and/or ambulation (gait belt, sit-to-stand, lift, walker, cane, etc )  - [place] fall precautions as indicated by assessment  - Record patient progress and toleration of activity level on Mobility SBAR; progress patient to next Phase/Stage  - Instruct patient to call for assistance with activity based on assessment  - Consider rehabilitation consult to assist with strengthening/weightbearing, etc   Outcome: Progressing     Problem: DISCHARGE PLANNING  Goal: Discharge to home or other facility with appropriate resources  Description  INTERVENTIONS:  - Identify barriers to discharge w/patient and caregiver  - Arrange for needed discharge resources and transportation as appropriate  - Identify discharge learning needs (meds, wound care, etc )  - Arrange for interpretive services to assist at discharge as needed  - Refer to Case Management Department for coordinating discharge planning if the patient needs post-hospital services based on physician/advanced practitioner order or complex needs related to functional status, cognitive ability, or social support system  Outcome: Progressing     Problem: Knowledge Deficit  Goal: Patient/family/caregiver demonstrates understanding of disease process, treatment plan, medications, and discharge instructions  Description  Complete learning assessment and assess knowledge base    Interventions:  - Provide teaching at level of understanding  - Provide teaching via preferred learning methods  Outcome: Progressing

## 2020-07-21 NOTE — ASSESSMENT & PLAN NOTE
· Pt presenting with epigastric abdominal pain, N/V/D x3 days  · Has been to the ER multiple times for marijuana related cyclical vomiting syndrome  · CT scan with evidence of wall thickening of the transverse colon and descending colon relating to either underdistention or colitis  · Symptoms therefore likely multifactorial as above  · Lipase negative   · Place on IV ceftriaxone and Flagyl in setting of lactic acidosis and leukocytosis to cover for colitis  · Started on clear liquid diet  · Aggressive IV fluid hydration  · Placed on Protonix, consider addition of Bentyl  · GI recommends EGD tomorrow  · Encourage marijuana cessation

## 2020-07-21 NOTE — QUICK NOTE
Evaluated patient at bedside to assess his abdominal pain after receiving Ativan  On exam patient appears uncomfortable, but is calm  He reports the Ativan helped him to relax more and the pain has decreased slightly, reports he still is in severe pain and wants to have a hot shower  He reports his pain is decreased to a 7/10  He does report a history of this kind of pain, has been hospitalized several times for abdominal pain  Vital signs are all within normal limits  Abdomen is tender to deep and light palpation, hypoactive bowel sounds x4  Patient with guarding on abdominal exam likely secondary to pain  Patient is still reporting severe abdominal pain, however improved from admission per H&P  Continue to monitor closely, and continue to follow lactic acid  Initial lactic acid was elevated at 4, repeat decreased to 2 2 after IV fluids, continue to trend  CT without any findings of acute thromboembolism or ischemia, so low suspicion for ischemic colitis at this time  However, continue to monitor closely and continue with GI consultation in a m  Will continue with IV fluids, Protonix, Zofran as needed

## 2020-07-21 NOTE — PROGRESS NOTES
Progress Note Britt Fabry 1980, 47 y o  male MRN: [id number]    Unit/Bed#: ED 17 Encounter: [id number]    Primary Care Provider: Salvatore Dey MD   Date and time admitted to hospital: 7/20/2020  4:57 PM        Asthma  Assessment & Plan  · Mild intermittent  · Continue p r n  Albuterol  · Not currently in acute exacerbation    Panic attack  Assessment & Plan  · Patient reports history of anxiety, however he is not prescribed any medication for this outpatient  ·  UDS positive for THC  · Monitor mood closely     Testicular abnormality  Assessment & Plan  · Incidental finding noted on CT scan - increased soft tissue density in the right testicular region  · Would recommend testicular ultrasound for further evaluation  It is still pending    ISAURO (acute kidney injury) ([facility] [address] )  Assessment & Plan  · POA, creatinine 1 14  It is currently improved at 0 9  · Baseline creatinine appears to be around 0 8 on review  · Likely in setting of dehydration and GI loss  · Continue IV fluid hydration  · Monitor BMP in the a m  · Avoid nephrotoxic agents and hypotension    Tobacco abuse  Assessment & Plan  · Reports quitting cigarettes, does chew tobacco  · Placed on nicotine patch here  · Encouraged cessation    Marijuana abuse  Assessment & Plan  · Patient reports smoking marijuana frequently at home  · Last use was a few days ago    · Could be contributing to patient's vomiting symptoms    Leukocytosis  Assessment & Plan  · POA, WBC is around 11 and improving  · Appears to be chronic on review of records  · Could be reactive secondary to dehydration/nausea and vomiting  · However due to CT findings of colitis, continue on empiric IV ceftriaxone and Flagyl for now  · Obtain CBC in the a m      Lactic acidosis  Assessment & Plan  Resolved    * Abdominal pain  Assessment & Plan  · Pt presenting with epigastric abdominal pain, N/V/D x3 days  · Has been to the ER multiple times for marijuana related cyclical vomiting syndrome  · CT scan with evidence of wall thickening of the transverse colon and descending colon relating to either underdistention or colitis  · Symptoms therefore likely multifactorial as above  · Lipase negative   · Place on IV ceftriaxone and Flagyl in setting of lactic acidosis and leukocytosis to cover for colitis  · Started on clear liquid diet  · Aggressive IV fluid hydration  · Placed on Protonix, consider addition of Bentyl  · GI recommends EGD tomorrow  · Encourage marijuana cessation        VTE Pharmacologic Prophylaxis:   Pharmacologic: Patient is ambulatory  Mechanical VTE Prophylaxis in Place: Yes    Patient Centered Rounds: I have performed bedside rounds with nursing staff today  Discussions with Specialists or Other Care Team Provider:  GI    Education and Discussions with Family / Patient:  Patient and his wife    Time Spent for Care: 20 minutes  More than 50% of total time spent on counseling and coordination of care as described above  Current Length of Stay: 0 day(s)    Current Patient Status: Observation   Certification Statement: The patient will continue to require additional inpatient hospital stay due to EGD    Discharge Plan:  24 hours    Code Status: Level 1 - Full Code      Subjective:   Patient was seen and examined today  He still reports epigastric abdominal pain, but feels like he could eat something  Objective:     Vitals:   Temp (24hrs), Av °F (37 2 °C), Min:99 °F (37 2 °C), Max:99 °F (37 2 °C)    Temp:  [99 °F (37 2 °C)] 99 °F (37 2 °C)  HR:  [66-86] 66  Resp:  [18-22] 19  BP: (125-161)/(70-73) 125/73  SpO2:  [99 %-100 %] 99 %  Body mass index is 30 29 kg/m²  Input and Output Summary (last 24 hours):        Intake/Output Summary (Last 24 hours) at 2020 1405  Last data filed at 2020 0710  Gross per 24 hour   Intake 3250 ml   Output 600 ml   Net 2650 ml       Physical Exam:     Physical Exam   Constitutional: He appears well-developed and well-nourished  Cardiovascular: Normal rate, regular rhythm and normal heart sounds  Pulmonary/Chest: Effort normal and breath sounds normal  No respiratory distress  Abdominal: Soft  There is tenderness  Musculoskeletal: He exhibits no edema or deformity  Neurological: He is alert  Skin: Skin is warm  No rash noted  No erythema  Psychiatric: He has a normal mood and affect  Additional Data:     Labs:    Results from last 7 days   Lab Units 07/21/20  0554 07/20/20  1710   WBC Thousand/uL 11 11* 13 08*   HEMOGLOBIN g/dL 13 0 15 4   HEMATOCRIT % 39 6 46 5   PLATELETS Thousands/uL 269 359   NEUTROS PCT %  --  84*   LYMPHS PCT %  --  10*   MONOS PCT %  --  5   EOS PCT %  --  0     Results from last 7 days   Lab Units 07/21/20  0554 07/20/20  1710   SODIUM mmol/L 140 140   POTASSIUM mmol/L 4 1 3 6   CHLORIDE mmol/L 107 103   CO2 mmol/L 23 22   BUN mg/dL 7 8   CREATININE mg/dL 0 96 1 14   ANION GAP mmol/L 10 15*   CALCIUM mg/dL 8 3 9 7   ALBUMIN g/dL  --  4 4   TOTAL BILIRUBIN mg/dL  --  0 90   ALK PHOS U/L  --  71   ALT U/L  --  37   AST U/L  --  22   GLUCOSE RANDOM mg/dL 116 123     Results from last 7 days   Lab Units 07/20/20  1710   INR  0 92             Results from last 7 days   Lab Units 07/21/20  0016 07/20/20  2107 07/20/20  1710   LACTIC ACID mmol/L 1 5 2 2* 4 4*           * I Have Reviewed All Lab Data Listed Above  * Additional Pertinent Lab Tests Reviewed:  All Labs Within Last 24 Hours Reviewed    Imaging:    CT abd  Recent Cultures (last 7 days):           Last 24 Hours Medication List:     Current Facility-Administered Medications:  acetaminophen 650 mg Oral Once Kathleen Browne PA-C    acetaminophen 650 mg Oral Q6H PRN Jimmey Payment, BRENNAN    albuterol 2 puff Inhalation Q4H PRN Jimmey Payment, BRENNAN    nicotine 7 mg Transdermal Daily Kathleen Browne PA-C    ondansetron 4 mg Intravenous Q8H PRN Jimmey Payment, BRENNAN    pantoprazole 40 mg Intravenous Q12H [address] Jimmey Payment, BRENNAN sodium chloride 125 mL/hr Intravenous Continuous Tanner Alcocre PA-C Last Rate: 125 mL/hr (07/21/20 0710)        Today, Patient Was Seen By: Kyleigh Singletary MD    ** Please Note: Dictation voice to text software may have been used in the creation of this document   **

## 2020-07-21 NOTE — SOCIAL WORK
LOS 17 HOURS  RISK OF UNPLANNED READMISSION SCORE N/A  30 DAY READMISSION: NO  BUNDLE: NO    Patient is COVID-19 negative  Per GI documentation, plan for EGD tomorrow  CM available through discharge

## 2020-07-21 NOTE — ASSESSMENT & PLAN NOTE
· Patient reports history of anxiety, however he is not prescribed any medication for this outpatient  ·  UDS positive for THC  · Monitor mood closely

## 2020-07-21 NOTE — SOCIAL WORK
LOS 17 HOURS  RISK OF UNPLANNED READMISSION SCORE N/A  30 DAY READMISSION: NO  BUNDLE: NO    Patient is a COVID R/O at this time, per CM protocol, CM not to enter room at this time  Per chart review, patient presents to [facility] ED with complaints of abdominal pain and diarrhea  Per documentation, patient was found too weak to come into the ED and was found laying out front of the entrance writhing around  Patient disclosed Hx of anxiety and marijuana use  Plan is to follow lactic acid, give IVF/Protonix/Zofran as needed, and await GI consult  CT scan without any findings

## 2020-07-21 NOTE — ASSESSMENT & PLAN NOTE
· POA, lactic 4 4  · Likely secondary to dehydration due to GI loss  · Repeat significantly improved at 2 2  · Continue IV fluid hydration  · Obtain repeat lactic Q 2 hours until normalized

## 2020-07-21 NOTE — ASSESSMENT & PLAN NOTE
· Incidental finding noted on CT scan - increased soft tissue density in the right testicular region  · Would recommend testicular ultrasound for further evaluation    It is still pending

## 2020-07-21 NOTE — ASSESSMENT & PLAN NOTE
· Reports quitting cigarettes, does chew tobacco  · Placed on nicotine patch here  · Encouraged cessation

## 2020-07-21 NOTE — UTILIZATION REVIEW
Initial Clinical Review    Admission: Date/Time/Statement: Admission Orders (From admission, onward)     Ordered        07/20/20 2117  Place in Observation (expected length of stay for this patient is less than two midnights)  Once                   Orders Placed This Encounter   Procedures    Place in Observation (expected length of stay for this patient is less than two midnights)     Standing Status:   Standing     Number of Occurrences:   1     Order Specific Question:   Admitting Physician     Answer:   Sameer De La Rosa [[id number]]     Order Specific Question:   Level of Care     Answer:   Med Surg [[id number]]     ED Arrival Information     Expected Arrival Acuity Means of Arrival Escorted By Service Admission Type    - 7/20/2020 16:56 Urgent Wheelchair Self Hospitalist Urgent    Arrival Complaint    abd pain         Chief Complaint   Patient presents with    Abdominal Pain     c/o abdominal pain and diarrhea     Assessment/Plan: 46 yo male to ED from home w/ abd pain , N/V x3 days   Extremely anxious   report smoking marijuana and last use was also a few days ago which he describes as a small amount  He also reports that he had a sip of alcohol last night and does have history of chewing tobacco   He reports that the only medication he is on at home is his albuterol inhaler  In ED found to have an elevated lactic acid level , leukocytosis , CT findings of colitis   Admitted OBS status plan to place on IV ceftriaxone , flagyl , NPO , IVF , start protonix, and consider bentyl   GI consult , check stool   ISAURO creat 1 14 baseline 0 8 likely dehydration , cont IVF , and monitor BMP       7/21 GI consult   Epigastric pain N/V plan for EGD 7/22 r/o PUD v H pylori v celiac   RUQ US to assess for cholelithiasis   diagnosis of cannabinoid hyperemesis syndrome and that it wouldn't be unreasonable to trial a period of marijuana abstinence for at least 6 weeks clear liquids , NPO after MN , COVID testing  , antiemetics , protonix   7/21  IM note   Abd pain - Protonix , EGD 7/22  Iv ceftriaxone and flagyl   THC sensation   ISAURO creat 1 14, improved to 0 9 likely dehydration and GI loss  Cont IVF and monitor     ED Triage Vitals [07/20/20 1659]   Temperature Pulse Respirations Blood Pressure SpO2   99 °F (37 2 °C) 84 18 161/71 100 %      Temp Source Heart Rate Source Patient Position - Orthostatic VS BP Location FiO2 (%)   Rectal Monitor Sitting Right arm --      Pain Score       3        Wt Readings from Last 1 Encounters:   10/23/19 107 kg (235 lb 14 3 oz)     Additional Vital Signs:   07/20/20 2200  --  86  22  141/70  99 %  --  --   07/20/20 1700  --  --  --  --  100 %           Pertinent Labs/Diagnostic Test Results:   7/20 CT head- wnl   7/20 CT abd Wall thickening of the transverse colon and descending colon could relate to underdistention or colitis      Increased soft tissue density in the right testicular region, correlate with testicular ultrasound     Results from last 7 days   Lab Units 07/21/20  0554 07/20/20  1710   WBC Thousand/uL 11 11* 13 08*   HEMOGLOBIN g/dL 13 0 15 4   HEMATOCRIT % 39 6 46 5   PLATELETS Thousands/uL 269 359   NEUTROS ABS Thousands/µL  --  11 01*     Results from last 7 days   Lab Units 07/21/20  0554 07/20/20  1710   SODIUM mmol/L 140 140   POTASSIUM mmol/L 4 1 3 6   CHLORIDE mmol/L 107 103   CO2 mmol/L 23 22   ANION GAP mmol/L 10 15*   BUN mg/dL 7 8   CREATININE mg/dL 0 96 1 14   EGFR ml/min/1 73sq m 100 81   CALCIUM mg/dL 8 3 9 7     Results from last 7 days   Lab Units 07/20/20  1710   AST U/L 22   ALT U/L 37   ALK PHOS U/L 71   TOTAL PROTEIN g/dL 8 0   ALBUMIN g/dL 4 4   TOTAL BILIRUBIN mg/dL 0 90     Results from last 7 days   Lab Units 07/21/20  0554 07/20/20  1710   GLUCOSE RANDOM mg/dL 116 123       Results from last 7 days   Lab Units 07/20/20  1710   TROPONIN I ng/mL <0 02     Results from last 7 days   Lab Units 07/20/20  1710   PROTIME seconds 12 6   INR  0 92   PTT seconds 26     Results from last 7 days   Lab Units 07/21/20  0016 07/20/20 2107 07/20/20  1710   LACTIC ACID mmol/L 1 5 2 2* 4 4*       Results from last 7 days   Lab Units 07/20/20  1710   LIPASE u/L 62*     Results from last 7 days   Lab Units 07/20/20  2011   CLARITY UA  Clear   COLOR UA  Yellow   SPEC GRAV UA  1 010   PH UA  8 5*   GLUCOSE UA mg/dl Negative   KETONES UA mg/dl 15 (1+)*   BLOOD UA  Negative   PROTEIN UA mg/dl Negative   NITRITE UA  Negative   BILIRUBIN UA  Negative   UROBILINOGEN UA E U /dl 0 2   LEUKOCYTES UA  Negative     Results from last 7 days   Lab Units 07/20/20  2220   AMPH/METH  Negative   BARBITURATE UR  Negative   BENZODIAZEPINE UR  Negative   COCAINE UR  Negative   METHADONE URINE  Negative   OPIATE UR  Negative   PCP UR  Negative   THC UR  Positive*     ED Treatment:   Medication Administration from 07/20/2020 1656 to 07/21/2020 1312       Date/Time Order Dose Route Action     07/20/2020 1713 sodium chloride 0 9 % bolus 1,000 mL 1,000 mL Intravenous New Bag     07/20/2020 1724 sucralfate (CARAFATE) oral suspension 1,000 mg 1,000 mg Oral Given     07/20/2020 1723 OLANZapine (ZyPREXA) IM injection 5 mg 5 mg Intramuscular Given     07/20/2020 1724 sterile water injection **ADS Override Pull** 2 1 mL  Given     07/20/2020 1927 sodium chloride 0 9 % bolus 1,000 mL 1,000 mL Intravenous New Bag     07/20/2020 2002 ondansetron (ZOFRAN) injection 4 mg 4 mg Intravenous Given     07/20/2020 2204 LORazepam (ATIVAN) injection 0 5 mg 0 5 mg Intravenous Given     07/21/2020 0710 sodium chloride 0 9 % infusion 125 mL/hr Intravenous New Bag     07/20/2020 2211 sodium chloride 0 9 % infusion 125 mL/hr Intravenous New Bag     07/20/2020 2215 pantoprazole (PROTONIX) injection 40 mg 40 mg Intravenous Given     07/20/2020 2218 ceftriaxone (ROCEPHIN) 1 g/50 mL in dextrose IVPB 1,000 mg Intravenous New Bag     07/21/2020 0555 metroNIDAZOLE (FLAGYL) IVPB (premix) 500 mg 100 mL 500 mg Intravenous New Bag     07/20/2020 2244 metroNIDAZOLE (FLAGYL) IVPB (premix) 500 mg 100 mL 500 mg Intravenous New Bag        Past Medical History:   Diagnosis Date    Asthma     Chronic pain     back pain     Present on Admission:   Lactic acidosis   Leukocytosis   Marijuana abuse   Tobacco abuse      Admitting Diagnosis: Abdominal pain [R10 9]  Age/Sex: 47 y o  male  Admission Orders:  Scheduled Medications:    Medications:  acetaminophen 650 mg Oral Once   cefTRIAXone 1,000 mg Intravenous Q24H   metroNIDAZOLE 500 mg Intravenous Q8H   nicotine 7 mg Transdermal Daily   pantoprazole 40 mg Intravenous Q12H [address]     Continuous IV Infusions:    sodium chloride 125 mL/hr Intravenous Continuous     PRN Meds:    acetaminophen 650 mg Oral Q6H PRN   albuterol 2 puff Inhalation Q4H PRN   ondansetron 4 mg Intravenous Q8H PRN     NPO   Act as marty     IP CONSULT TO GASTROENTEROLOGY    Network Utilization Review Department  [email or web address]  ATTENTION: Please call with any questions or concerns to [phone] and carefully listen to the prompts so that you are directed to the right person  All voicemails are confidential   Send all requests for admission clinical and any other requests to dedicated fax number below belonging to the campus where the patient is receiving treatment   List of dedicated fax numbers for the Facilities:  [address] [place] [address] DENIALS (Administrative/Medical Necessity) [phone]   [address] (Maternity/NICU/Pediatrics) [phone]   Sari Whitehead [phone]   Arabella Geronimo [phone]   [address] [place] [address] [phone]   [address] [facility]  [phone]   [address] [place] [address] [phone]   [facility]  [phone]   [address] [facility] [address]  [address] [address] [address] [facility] [phone]

## 2020-07-22 ENCOUNTER — APPOINTMENT (OUTPATIENT)
Dept: CT IMAGING | Facility: HOSPITAL | Age: 40
DRG: 469 | End: 2020-07-22
Payer: COMMERCIAL

## 2020-07-22 ENCOUNTER — APPOINTMENT (OUTPATIENT)
Dept: GASTROENTEROLOGY | Facility: HOSPITAL | Age: 40
DRG: 469 | End: 2020-07-22
Payer: COMMERCIAL

## 2020-07-22 ENCOUNTER — ANESTHESIA (OUTPATIENT)
Dept: GASTROENTEROLOGY | Facility: HOSPITAL | Age: 40
DRG: 469 | End: 2020-07-22
Payer: COMMERCIAL

## 2020-07-22 ENCOUNTER — ANESTHESIA EVENT (OUTPATIENT)
Dept: GASTROENTEROLOGY | Facility: HOSPITAL | Age: 40
DRG: 469 | End: 2020-07-22
Payer: COMMERCIAL

## 2020-07-22 LAB
C DIFF TOX GENS STL QL NAA+PROBE: NEGATIVE
CAMPYLOBACTER DNA SPEC NAA+PROBE: NORMAL
CRP SERPL QL: <3 MG/L
SALMONELLA DNA SPEC QL NAA+PROBE: NORMAL
SHIGA TOXIN STX GENE SPEC NAA+PROBE: NORMAL
SHIGELLA DNA SPEC QL NAA+PROBE: NORMAL

## 2020-07-22 PROCEDURE — 0DB98ZX EXCISION OF DUODENUM, VIA NATURAL OR ARTIFICIAL OPENING ENDOSCOPIC, DIAGNOSTIC: ICD-10-PCS | Performed by: INTERNAL MEDICINE

## 2020-07-22 PROCEDURE — 83993 ASSAY FOR CALPROTECTIN FECAL: CPT | Performed by: INTERNAL MEDICINE

## 2020-07-22 PROCEDURE — 0DB68ZX EXCISION OF STOMACH, VIA NATURAL OR ARTIFICIAL OPENING ENDOSCOPIC, DIAGNOSTIC: ICD-10-PCS | Performed by: INTERNAL MEDICINE

## 2020-07-22 PROCEDURE — C9113 INJ PANTOPRAZOLE SODIUM, VIA: HCPCS | Performed by: PHYSICIAN ASSISTANT

## 2020-07-22 PROCEDURE — 99232 SBSQ HOSP IP/OBS MODERATE 35: CPT | Performed by: FAMILY MEDICINE

## 2020-07-22 PROCEDURE — 86140 C-REACTIVE PROTEIN: CPT | Performed by: INTERNAL MEDICINE

## 2020-07-22 PROCEDURE — 88305 TISSUE EXAM BY PATHOLOGIST: CPT | Performed by: PATHOLOGY

## 2020-07-22 PROCEDURE — 43239 EGD BIOPSY SINGLE/MULTIPLE: CPT | Performed by: INTERNAL MEDICINE

## 2020-07-22 PROCEDURE — 0DB48ZX EXCISION OF ESOPHAGOGASTRIC JUNCTION, VIA NATURAL OR ARTIFICIAL OPENING ENDOSCOPIC, DIAGNOSTIC: ICD-10-PCS | Performed by: INTERNAL MEDICINE

## 2020-07-22 PROCEDURE — 0DB78ZX EXCISION OF STOMACH, PYLORUS, VIA NATURAL OR ARTIFICIAL OPENING ENDOSCOPIC, DIAGNOSTIC: ICD-10-PCS | Performed by: INTERNAL MEDICINE

## 2020-07-22 PROCEDURE — 74176 CT ABD & PELVIS W/O CONTRAST: CPT

## 2020-07-22 PROCEDURE — G0425 INPT/ED TELECONSULT30: HCPCS | Performed by: PSYCHIATRY & NEUROLOGY

## 2020-07-22 RX ORDER — LEVALBUTEROL INHALATION SOLUTION 0.63 MG/3ML
0.63 SOLUTION RESPIRATORY (INHALATION) ONCE
Status: COMPLETED | OUTPATIENT
Start: 2020-07-22 | End: 2020-07-22

## 2020-07-22 RX ORDER — GLYCOPYRROLATE 0.2 MG/ML
INJECTION INTRAMUSCULAR; INTRAVENOUS AS NEEDED
Status: DISCONTINUED | OUTPATIENT
Start: 2020-07-22 | End: 2020-07-22 | Stop reason: SURG

## 2020-07-22 RX ORDER — PROPOFOL 10 MG/ML
INJECTION, EMULSION INTRAVENOUS AS NEEDED
Status: DISCONTINUED | OUTPATIENT
Start: 2020-07-22 | End: 2020-07-22 | Stop reason: SURG

## 2020-07-22 RX ORDER — LORAZEPAM 2 MG/ML
0.5 INJECTION INTRAMUSCULAR EVERY 6 HOURS PRN
Status: DISCONTINUED | OUTPATIENT
Start: 2020-07-22 | End: 2020-07-22

## 2020-07-22 RX ORDER — METOCLOPRAMIDE HYDROCHLORIDE 5 MG/ML
10 INJECTION INTRAMUSCULAR; INTRAVENOUS EVERY 6 HOURS PRN
Status: DISCONTINUED | OUTPATIENT
Start: 2020-07-22 | End: 2020-07-22

## 2020-07-22 RX ORDER — LORAZEPAM 1 MG/1
1 TABLET ORAL EVERY 6 HOURS PRN
Status: DISCONTINUED | OUTPATIENT
Start: 2020-07-22 | End: 2020-07-23 | Stop reason: HOSPADM

## 2020-07-22 RX ORDER — SODIUM CHLORIDE 9 MG/ML
75 INJECTION, SOLUTION INTRAVENOUS CONTINUOUS
Status: DISCONTINUED | OUTPATIENT
Start: 2020-07-22 | End: 2020-07-23 | Stop reason: HOSPADM

## 2020-07-22 RX ORDER — SIMETHICONE 80 MG
80 TABLET,CHEWABLE ORAL EVERY 6 HOURS PRN
Status: DISCONTINUED | OUTPATIENT
Start: 2020-07-22 | End: 2020-07-23 | Stop reason: HOSPADM

## 2020-07-22 RX ORDER — KETAMINE HCL IN NACL, ISO-OSM 100MG/10ML
SYRINGE (ML) INJECTION AS NEEDED
Status: DISCONTINUED | OUTPATIENT
Start: 2020-07-22 | End: 2020-07-22 | Stop reason: SURG

## 2020-07-22 RX ORDER — METOCLOPRAMIDE HYDROCHLORIDE 5 MG/ML
10 INJECTION INTRAMUSCULAR; INTRAVENOUS EVERY 6 HOURS PRN
Status: DISCONTINUED | OUTPATIENT
Start: 2020-07-22 | End: 2020-07-23 | Stop reason: HOSPADM

## 2020-07-22 RX ORDER — DICYCLOMINE HYDROCHLORIDE 10 MG/1
10 CAPSULE ORAL EVERY 6 HOURS PRN
Status: DISCONTINUED | OUTPATIENT
Start: 2020-07-22 | End: 2020-07-23 | Stop reason: HOSPADM

## 2020-07-22 RX ORDER — LIDOCAINE HYDROCHLORIDE 10 MG/ML
INJECTION, SOLUTION EPIDURAL; INFILTRATION; INTRACAUDAL; PERINEURAL AS NEEDED
Status: DISCONTINUED | OUTPATIENT
Start: 2020-07-22 | End: 2020-07-22 | Stop reason: SURG

## 2020-07-22 RX ADMIN — LEVALBUTEROL HYDROCHLORIDE 0.63 MG: 0.63 SOLUTION RESPIRATORY (INHALATION) at 11:26

## 2020-07-22 RX ADMIN — PROPOFOL 50 MG: 10 INJECTION, EMULSION INTRAVENOUS at 12:00

## 2020-07-22 RX ADMIN — SODIUM CHLORIDE 125 ML/HR: 0.9 INJECTION, SOLUTION INTRAVENOUS at 00:32

## 2020-07-22 RX ADMIN — LORAZEPAM 1 MG: 1 TABLET ORAL at 17:26

## 2020-07-22 RX ADMIN — LIDOCAINE HYDROCHLORIDE 50 MG: 10 INJECTION, SOLUTION EPIDURAL; INFILTRATION; INTRACAUDAL; PERINEURAL at 11:54

## 2020-07-22 RX ADMIN — SODIUM CHLORIDE 75 ML/HR: 0.9 INJECTION, SOLUTION INTRAVENOUS at 20:34

## 2020-07-22 RX ADMIN — SODIUM CHLORIDE 125 ML/HR: 0.9 INJECTION, SOLUTION INTRAVENOUS at 07:57

## 2020-07-22 RX ADMIN — LORAZEPAM 0.5 MG: 2 INJECTION INTRAMUSCULAR; INTRAVENOUS at 14:54

## 2020-07-22 RX ADMIN — GLYCOPYRROLATE 0.4 MG: 0.2 INJECTION, SOLUTION INTRAMUSCULAR; INTRAVENOUS at 11:54

## 2020-07-22 RX ADMIN — ONDANSETRON 4 MG: 2 INJECTION INTRAMUSCULAR; INTRAVENOUS at 14:41

## 2020-07-22 RX ADMIN — DICYCLOMINE HYDROCHLORIDE 10 MG: 10 CAPSULE ORAL at 17:26

## 2020-07-22 RX ADMIN — PANTOPRAZOLE SODIUM 40 MG: 40 INJECTION, POWDER, FOR SOLUTION INTRAVENOUS at 20:35

## 2020-07-22 RX ADMIN — Medication 20 MG: at 11:54

## 2020-07-22 RX ADMIN — PANTOPRAZOLE SODIUM 40 MG: 40 INJECTION, POWDER, FOR SOLUTION INTRAVENOUS at 08:23

## 2020-07-22 RX ADMIN — SIMETHICONE 80 MG: 80 TABLET, CHEWABLE ORAL at 17:26

## 2020-07-22 RX ADMIN — PROPOFOL 200 MG: 10 INJECTION, EMULSION INTRAVENOUS at 11:54

## 2020-07-22 NOTE — ASSESSMENT & PLAN NOTE
· Pt presenting with epigastric abdominal pain, N/V/D x3 days  · Has been to the ER multiple times for marijuana related cyclical vomiting syndrome  · CT scan with evidence of wall thickening of the transverse colon and descending colon relating to either underdistention or colitis  · Symptoms therefore likely multifactorial as above  · Lipase negative   · CT abdomen negative for colitis  · Started on the regular diet  ·   · Placed on Protonix, consider addition of Bentyl  · GI has seen the patient and proceeded with EGD which revealed GE junction erosion and inflammation  · Continue Protonix  · Encourage marijuana cessation

## 2020-07-22 NOTE — UTILIZATION REVIEW
Continued Stay Review    Date: 7/22                        OBS order  7/20 2117  Converted to IP on 7/22 @1444 for continued GI  and psych work up for N/V and  panic attack      Current Patient Class: OBS  Current Level of Care: MS     HPI:40 y o  male initially admitted on 7/20    Assessment/Plan: pt having GI workup , EGD revealed GE junction erosion and inflammation   Plan to cont protonix  Pt w/ panic attack , consult to psych and recommend prn anxiolytics and close observation   7/22 Psych consult   Anxiety , THC THC hyperemesis syndrome who presents for intractable vomiting that very much looks like a repeat of his aforementioned hyperemesis syndrome   Plan for close observation , cont ativan , nicotine patch , zofran ,     Pertinent Labs/Diagnostic Results:   7/22 CT abd - No pneumoperitoneum is seen  No bowel obstruction    Results from last 7 days   Lab Units 07/21/20  1020   SARS-COV-2  Negative     Results from last 7 days   Lab Units 07/21/20  0554 07/20/20  1710   WBC Thousand/uL 11 11* 13 08*   HEMOGLOBIN g/dL 13 0 15 4   HEMATOCRIT % 39 6 46 5   PLATELETS Thousands/uL 269 359   NEUTROS ABS Thousands/µL  --  11 01*     Results from last 7 days   Lab Units 07/21/20  0554 07/20/20  1710   SODIUM mmol/L 140 140   POTASSIUM mmol/L 4 1 3 6   CHLORIDE mmol/L 107 103   CO2 mmol/L 23 22   ANION GAP mmol/L 10 15*   BUN mg/dL 7 8   CREATININE mg/dL 0 96 1 14   EGFR ml/min/1 73sq m 100 81   CALCIUM mg/dL 8 3 9 7     Results from last 7 days   Lab Units 07/20/20  1710   AST U/L 22   ALT U/L 37   ALK PHOS U/L 71   TOTAL PROTEIN g/dL 8 0   ALBUMIN g/dL 4 4   TOTAL BILIRUBIN mg/dL 0 90         Results from last 7 days   Lab Units 07/21/20  0554 07/20/20  1710   GLUCOSE RANDOM mg/dL 116 123     Results from last 7 days   Lab Units 07/20/20  1710   TROPONIN I ng/mL <0 02     Results from last 7 days   Lab Units 07/20/20  1710   PROTIME seconds 12 6   INR  0 92   PTT seconds 26     Results from last 7 days   Lab Units 07/21/20  0016 07/20/20  2107 07/20/20  1710   LACTIC ACID mmol/L 1 5 2 2* 4 4*     Results from last 7 days   Lab Units 07/20/20  1710   LIPASE u/L 62*     Results from last 7 days   Lab Units 07/22/20  0510   CRP mg/L <3 0     Results from last 7 days   Lab Units 07/20/20 2011   CLARITY UA  Clear   COLOR UA  Yellow   SPEC GRAV UA  1 010   PH UA  8 5*   GLUCOSE UA mg/dl Negative   KETONES UA mg/dl 15 (1+)*   BLOOD UA  Negative   PROTEIN UA mg/dl Negative   NITRITE UA  Negative   BILIRUBIN UA  Negative   UROBILINOGEN UA E U /dl 0 2   LEUKOCYTES UA  Negative     Results from last 7 days   Lab Units 07/20/20  2220   AMPH/METH  Negative   BARBITURATE UR  Negative   BENZODIAZEPINE UR  Negative   COCAINE UR  Negative   METHADONE URINE  Negative   OPIATE UR  Negative   PCP UR  Negative   THC UR  Positive*     Results from last 7 days   Lab Units 07/21/20  1605   C DIFF TOXIN B  Negative     Results from last 7 days   Lab Units 07/21/20  1605   SALMONELLA SP PCR  None Detected   SHIGELLA SP/ENTEROINVASIVE E  COLI (EIEC)  None Detected   CAMPYLOBACTER SP (JEJUNI AND COLI)  None Detected   SHIGA TOXIN 1/SHIGA TOXIN 2  None Detected       Vital Signs:   07/22/20 07:52:04  98 1 °F (36 7 °C)  --  --  125/73  90  --  --  --   07/22/20 0749  --  55  20  --  --  95 %  None (Room air)  Lying   07/22/20 0104  --  --  --  --  --  --  None (Room air           Medications:   Scheduled Medications:    Medications:  acetaminophen 650 mg Oral Once   nicotine 7 mg Transdermal Daily   pantoprazole 40 mg Intravenous Q12H [address]     Continuous IV Infusions:    sodium chloride 125 mL/hr Intravenous Continuous     PRN Meds:    Ativan 1 mg q6h prn   Iv reglan q6h prn   Xoponex tx x1  Po bentyl q6h prn   Iv ativan x1  acetaminophen 650 mg Oral Q6H PRN   albuterol 2 puff Inhalation Q4H PRN   ondansetron 4 mg Intravenous Q8H PRN       Discharge Plan: TBD    Network Utilization Review Department  [email or web address]  ATTENTION: Please call with any questions or concerns to [phone] and carefully listen to the prompts so that you are directed to the right person  All voicemails are confidential   [facility] all requests for admission clinical reviews, approved or denied determinations and any other requests to dedicated fax number below belonging to the campus where the patient is receiving treatment   List of dedicated fax numbers for the Facilities:  [address] DENIALS (Administrative/Medical Necessity) [phone]   [address] (Maternity/NICU/Pediatrics) [phone]   [facility] [phone]   [place] [phone]   Kristal Watson [phone]   Mich Pittman [phone]   [address] [place] [address] [phone]   [facility]  [phone]   [address] [place] [address], [address]  [facility] [address] [facility] [phone]

## 2020-07-22 NOTE — NURSING NOTE
Expiratory wheeze present on L side, pt has history of smoking  Pt provided incentive spirometer, suggestion of use and instruction for use was provided with teach back

## 2020-07-22 NOTE — ANESTHESIA POSTPROCEDURE EVALUATION
Post-Op Assessment Note    CV Status:  Stable  Pain Score: 0    Pain management: adequate     Mental Status:  Alert and awake   Hydration Status:  Euvolemic   PONV Controlled:  Controlled   Airway Patency:  Patent   Post Op Vitals Reviewed: Yes      Staff: CRNA           /70 (07/22/20 1208)    Temp 98 4 °F (36 9 °C) (07/22/20 1208)    Pulse 72 (07/22/20 1208)   Resp 20 (07/22/20 1208)    SpO2 100 % (07/22/20 1208)

## 2020-07-22 NOTE — ASSESSMENT & PLAN NOTE
· POA, WBC is around 11 and improving  · Appears to be chronic on review of records  · Could be reactive secondary to dehydration/nausea and vomiting  · However due to CT findings of colitis, continue on empiric IV ceftriaxone and Flagyl for now  ·

## 2020-07-22 NOTE — ASSESSMENT & PLAN NOTE
· Incidental finding noted on CT scan - increased soft tissue density in the right testicular region  · Testicular ultrasound was normal

## 2020-07-22 NOTE — PROGRESS NOTES
Pt returned from test AAOx4  Pt ambulated to the bed  Pt offers no c/o pain at this time  Call bell in reach

## 2020-07-22 NOTE — ASSESSMENT & PLAN NOTE
· Patient reports history of anxiety, however he is not prescribed any medication for this outpatient  · Consulted psychiatry and they recommend p r n   Anxiolytics while in the hospital with close observation  ·  UDS positive for THC  · Monitor mood closely

## 2020-07-22 NOTE — PROGRESS NOTES
Progress Note Aida Montesinos 1980, 47 y o  male MRN: [id number]    Unit/Bed#: -01 Encounter: [id number]    Primary Care Provider: Dmitriy Tee MD   Date and time admitted to hospital: 7/20/2020  4:57 PM        Asthma  Assessment & Plan  · Mild intermittent  · Continue p r n  Albuterol  · Not currently in acute exacerbation    Panic attack  Assessment & Plan  · Patient reports history of anxiety, however he is not prescribed any medication for this outpatient  · Consulted psychiatry and they recommend p r n   Anxiolytics while in the hospital with close observation  ·  UDS positive for THC  · Monitor mood closely     Testicular abnormality  Assessment & Plan  · Incidental finding noted on CT scan - increased soft tissue density in the right testicular region  · Testicular ultrasound was normal    Tobacco abuse  Assessment & Plan  · Reports quitting cigarettes, does chew tobacco  · Placed on nicotine patch here  · Encouraged cessation    Marijuana abuse  Assessment & Plan  · Patient reports smoking marijuana frequently at home  · Last use was a few days ago  · Encouraged cessation    · Could be contributing to patient's vomiting symptoms    Leukocytosis  Assessment & Plan  · POA, WBC is around 11 and improving  · Appears to be chronic on review of records  · Could be reactive secondary to dehydration/nausea and vomiting  · However due to CT findings of colitis, continue on empiric IV ceftriaxone and Flagyl for now  ·     Lactic acidosis  Assessment & Plan  Resolved    * Abdominal pain  Assessment & Plan  · Pt presenting with epigastric abdominal pain, N/V/D x3 days  · Has been to the ER multiple times for marijuana related cyclical vomiting syndrome  · CT scan with evidence of wall thickening of the transverse colon and descending colon relating to either underdistention or colitis  · Symptoms therefore likely multifactorial as above  · Lipase negative   · CT abdomen negative for colitis  · Started on the regular diet  ·   · Placed on Protonix, consider addition of Bentyl  · GI has seen the patient and proceeded with EGD which revealed GE junction erosion and inflammation  · Continue Protonix  · Encourage marijuana cessation        VTE Pharmacologic Prophylaxis:   Pharmacologic: Patient is ambulatory  Mechanical VTE Prophylaxis in Place: Yes    Patient Centered Rounds: I have performed bedside rounds with nursing staff today  Discussions with Specialists or Other Care Team Provider:  GI    Education and Discussions with Family / Patient:  Patient    Time Spent for Care: 30 minutes  More than 50% of total time spent on counseling and coordination of care as described above  Current Length of Stay: 0 day(s)    Current Patient Status: Inpatient   Certification Statement: The patient will continue to require additional inpatient hospital stay due to Panic attack    Discharge Plan:  24 hours    Code Status: Level 1 - Full Code      Subjective:   Patient was seen and examined  He just came back from EGD  He reports that he still has abdominal pain  He is concerned that no explanation for his abdominal pain was found  He is quite anxious today and has been experiencing "a panic attack" which he believes is contributing to his abdominal pain  Objective:     Vitals:   Temp (24hrs), Av 4 °F (36 9 °C), Min:98 1 °F (36 7 °C), Max:98 8 °F (37 1 °C)    Temp:  [98 1 °F (36 7 °C)-98 8 °F (37 1 °C)] 98 8 °F (37 1 °C)  HR:  [55-82] 82  Resp:  [18-24] 18  BP: (123-132)/(60-83) 132/83  SpO2:  [95 %-100 %] 100 %  Body mass index is 29 48 kg/m²  Input and Output Summary (last 24 hours): Intake/Output Summary (Last 24 hours) at 2020 1620  Last data filed at 2020 1457  Gross per 24 hour   Intake 1347 08 ml   Output 1100 ml   Net 247 08 ml       Physical Exam:     Physical Exam   Constitutional: He appears well-developed and well-nourished  HENT:   Head: Normocephalic and atraumatic  Cardiovascular: Normal rate, regular rhythm and normal heart sounds  Pulmonary/Chest: Effort normal and breath sounds normal  No respiratory distress  Abdominal: Soft  There is tenderness  Musculoskeletal: He exhibits no edema or deformity  Neurological: He is alert  Skin: Skin is warm  No rash noted  No erythema  Psychiatric: His mood appears anxious  Additional Data:     Labs:    Results from last 7 days   Lab Units 07/21/20  0554 07/20/20  1710   WBC Thousand/uL 11 11* 13 08*   HEMOGLOBIN g/dL 13 0 15 4   HEMATOCRIT % 39 6 46 5   PLATELETS Thousands/uL 269 359   NEUTROS PCT %  --  84*   LYMPHS PCT %  --  10*   MONOS PCT %  --  5   EOS PCT %  --  0     Results from last 7 days   Lab Units 07/21/20  0554 07/20/20  1710   SODIUM mmol/L 140 140   POTASSIUM mmol/L 4 1 3 6   CHLORIDE mmol/L 107 103   CO2 mmol/L 23 22   BUN mg/dL 7 8   CREATININE mg/dL 0 96 1 14   ANION GAP mmol/L 10 15*   CALCIUM mg/dL 8 3 9 7   ALBUMIN g/dL  --  4 4   TOTAL BILIRUBIN mg/dL  --  0 90   ALK PHOS U/L  --  71   ALT U/L  --  37   AST U/L  --  22   GLUCOSE RANDOM mg/dL 116 123     Results from last 7 days   Lab Units 07/20/20  1710   INR  0 92             Results from last 7 days   Lab Units 07/21/20  0016 07/20/20  2107 07/20/20  1710   LACTIC ACID mmol/L 1 5 2 2* 4 4*           * I Have Reviewed All Lab Data Listed Above  * Additional Pertinent Lab Tests Reviewed:  All Labs Within Last 24 Hours Reviewed    Imaging:      Recent Cultures (last 7 days):     Results from last 7 days   Lab Units 07/21/20  1605   C DIFF TOXIN B  Negative       Last 24 Hours Medication List:     Current Facility-Administered Medications:  acetaminophen 650 mg Oral Once Kathleen Browne PA-C   acetaminophen 650 mg Oral Q6H PRN Ne Rivera PA-C   albuterol 2 puff Inhalation Q4H PRN Ne Rivera PA-C   dicyclomine 10 mg Oral Q6H PRN Leeanne Ward PA-C   LORazepam 1 mg Oral Q6H PRN Belinda Booker MD   metoclopramide 10 mg Intravenous Q6H PRN Leeanne Ward PA-C   nicotine 7 mg Transdermal Daily Kathleen Browne PA-C   ondansetron 4 mg Intravenous Q8H PRN Jeison Santiago PA-C   pantoprazole 40 mg Intravenous Q12H [address] Jeison Santiago PA-C        Today, Patient Was Seen By: Anthony Reinoso MD    ** Please Note: Dictation voice to text software may have been used in the creation of this document   **

## 2020-07-22 NOTE — NURSING NOTE
Pt is currently in knee chest position on bed using guided breathing, he is intermittently dry heaving into a basin  Zofran was administered and this nurse stayed with the patient to assist in deescalating the situation
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
## 2020-07-23 VITALS
RESPIRATION RATE: 20 BRPM | HEART RATE: 81 BPM | TEMPERATURE: 97.9 F | HEIGHT: 75 IN | BODY MASS INDEX: 29.33 KG/M2 | DIASTOLIC BLOOD PRESSURE: 82 MMHG | OXYGEN SATURATION: 98 % | WEIGHT: 235.89 LBS | SYSTOLIC BLOOD PRESSURE: 140 MMHG

## 2020-07-23 PROCEDURE — C9113 INJ PANTOPRAZOLE SODIUM, VIA: HCPCS | Performed by: PHYSICIAN ASSISTANT

## 2020-07-23 PROCEDURE — 99239 HOSP IP/OBS DSCHRG MGMT >30: CPT | Performed by: FAMILY MEDICINE

## 2020-07-23 RX ORDER — ESCITALOPRAM OXALATE 10 MG/1
10 TABLET ORAL DAILY
Qty: 30 TABLET | Refills: 0 | Status: SHIPPED | OUTPATIENT
Start: 2020-07-23

## 2020-07-23 RX ORDER — DICYCLOMINE HYDROCHLORIDE 10 MG/1
10 CAPSULE ORAL EVERY 6 HOURS PRN
Qty: 120 CAPSULE | Refills: 0 | Status: SHIPPED | OUTPATIENT
Start: 2020-07-23

## 2020-07-23 RX ORDER — PANTOPRAZOLE SODIUM 40 MG/1
40 TABLET, DELAYED RELEASE ORAL DAILY
Qty: 30 TABLET | Refills: 0 | Status: SHIPPED | OUTPATIENT
Start: 2020-07-23

## 2020-07-23 RX ADMIN — PANTOPRAZOLE SODIUM 40 MG: 40 INJECTION, POWDER, FOR SOLUTION INTRAVENOUS at 09:17

## 2020-07-23 NOTE — UTILIZATION REVIEW
Notification of Inpatient Admission/Inpatient Authorization Request   This is a Notification of Inpatient Admission for [address]  Be advised that this patient was admitted to our facility under Inpatient Status  Contact dT Leiva at [phone] for additional admission information  [facility] [place] DEPT DEDICATED Monique Smith [phone]  Patient Name:   Tiana Castro   YOB: 1980       [address]   [address]:   [address]   Tax ID: [id number]  NPI: [id number] Attending Provider/NPI:  Phone:  Address: Krista Poe, [address] [[id number]]  [phone]  Same as ANG/Marce Rico 1106 of Service Code: 24     Place of Service Name:  [address] [place] [address]   Start Date: 7/22/20 1444 Discharge Date & Time: No discharge date for patient encounter  Type of Admission: Inpatient Status Discharge Disposition   (if discharged): Home/Self Care   Patient Diagnoses: Lactic acidosis [E87 2]  Colitis [K52 9]  Marijuana abuse [F12 10]  Epigastric pain [R10 13]  Abdominal pain [R10 9]  Nausea and vomiting [R11 2]     Orders: Admission Orders (From admission, onward)     Ordered        07/22/20 1444  Inpatient Admission  Once         07/20/20 2117  Place in Observation (expected length of stay for this patient is less than two midnights)  Once                    Assigned Utilization Review Contact: Td Leiva  Utilization   Network Utilization Review Department  Phone: [phone]; Fax [phone]  Email: [email or web address]   ATTENTION PAYERS: Please call the assigned Utilization  directly with any questions or concerns ALL voicemails in the department are confidential  Send all requests for admission clinical reviews, approved or denied determinations and any other requests to dedicated fax number belonging to the campus where the patient is receiving treatment    Initial Clinical Review    Admission: Date/Time/Statement:   Admission Orders (From admission, onward)     Ordered        07/22/20 1444  Inpatient Admission  Once         07/20/20 2117  Place in Observation (expected length of stay for this patient is less than two midnights)  Once                   Orders Placed This Encounter   Procedures    Place in Observation (expected length of stay for this patient is less than two midnights)     Standing Status:   Standing     Number of Occurrences:   1     Order Specific Question:   Admitting Physician     Answer:   Snow Lyon [[id number]]     Order Specific Question:   Level of Care     Answer:   Med Surg [16]    Inpatient Admission     Standing Status:   Standing     Number of Occurrences:   1     Order Specific Question:   Admitting Physician     Answer:   Sindhu Roman [[id number]]     Order Specific Question:   Level of Care     Answer:   Med Surg [16]     Order Specific Question:   Estimated length of stay     Answer:   More than 2 Midnights     Order Specific Question:   Certification     Answer:   I certify that inpatient services are medically necessary for this patient for a duration of greater than two midnights  See H&P and MD Progress Notes for additional information about the patient's course of treatment  ED Arrival Information     Expected Arrival Acuity Means of Arrival Escorted By Service Admission Type    - 7/20/2020 16:56 Urgent Wheelchair Self Hospitalist Urgent    Arrival Complaint    abd pain         Chief Complaint   Patient presents with    Abdominal Pain     c/o abdominal pain and diarrhea     Assessment/Plan: 46 yo male to ED from home w/ abd pain , N/V x3 days   Extremely anxious   report smoking marijuana and last use was also a few days ago which he describes as a small amount  He also reports that he had a sip of alcohol last night and does have history of chewing tobacco   He reports that the only medication he is on at home is his albuterol inhaler   In ED found to have an elevated lactic acid level , leukocytosis , CT findings of colitis   Admitted OBS status plan to place on IV ceftriaxone , flagyl , NPO , IVF , start protonix, and consider bentyl   GI consult , check stool   ISAURO creat 1 14 baseline 0 8 likely dehydration , cont IVF , and monitor BMP       7/21 GI consult   Epigastric pain N/V plan for EGD 7/22 r/o PUD v H pylori v celiac   RUQ US to assess for cholelithiasis   diagnosis of cannabinoid hyperemesis syndrome and that it wouldn't be unreasonable to trial a period of marijuana abstinence for at least 6 weeks clear liquids , NPO after MN , COVID testing  , antiemetics , protonix   7/21  IM note   Abd pain - Protonix , EGD 7/22  Iv ceftriaxone and flagyl   THC sensation   ISAURO creat 1 14, improved to 0 9 likely dehydration and GI loss  Cont IVF and monitor     ED Triage Vitals [07/20/20 1659]   Temperature Pulse Respirations Blood Pressure SpO2   99 °F (37 2 °C) 84 18 161/71 100 %      Temp Source Heart Rate Source Patient Position - Orthostatic VS BP Location FiO2 (%)   Rectal Monitor Sitting Right arm --      Pain Score       3          Wt Readings from Last 1 Encounters:   07/21/20 107 kg (235 lb 14 3 oz)     Additional Vital Signs:   07/20/20 2200  --  86  22  141/70  99 %  --  --   07/20/20 1700  --  --  --  --  100 %           Pertinent Labs/Diagnostic Test Results:   7/20 CT head- wnl   7/20 CT abd Wall thickening of the transverse colon and descending colon could relate to underdistention or colitis      Increased soft tissue density in the right testicular region, correlate with testicular ultrasound     Results from last 7 days   Lab Units 07/21/20  0554 07/20/20  1710   WBC Thousand/uL 11 11* 13 08*   HEMOGLOBIN g/dL 13 0 15 4   HEMATOCRIT % 39 6 46 5   PLATELETS Thousands/uL 269 359   NEUTROS ABS Thousands/µL  --  11 01*     Results from last 7 days   Lab Units 07/21/20  0554 07/20/20  1710   SODIUM mmol/L 140 140   POTASSIUM mmol/L 4 1 3  6   CHLORIDE mmol/L 107 103   CO2 mmol/L 23 22   ANION GAP mmol/L 10 15*   BUN mg/dL 7 8   CREATININE mg/dL 0 96 1 14   EGFR ml/min/1 73sq m 100 81   CALCIUM mg/dL 8 3 9 7     Results from last 7 days   Lab Units 07/20/20  1710   AST U/L 22   ALT U/L 37   ALK PHOS U/L 71   TOTAL PROTEIN g/dL 8 0   ALBUMIN g/dL 4 4   TOTAL BILIRUBIN mg/dL 0 90     Results from last 7 days   Lab Units 07/21/20  0554 07/20/20  1710   GLUCOSE RANDOM mg/dL 116 123       Results from last 7 days   Lab Units 07/20/20  1710   TROPONIN I ng/mL <0 02     Results from last 7 days   Lab Units 07/20/20  1710   PROTIME seconds 12 6   INR  0 92   PTT seconds 26     Results from last 7 days   Lab Units 07/21/20  0016 07/20/20  2107 07/20/20  1710   LACTIC ACID mmol/L 1 5 2 2* 4 4*       Results from last 7 days   Lab Units 07/20/20  1710   LIPASE u/L 62*     Results from last 7 days   Lab Units 07/20/20 2011   CLARITY UA  Clear   COLOR UA  Yellow   SPEC GRAV UA  1 010   PH UA  8 5*   GLUCOSE UA mg/dl Negative   KETONES UA mg/dl 15 (1+)*   BLOOD UA  Negative   PROTEIN UA mg/dl Negative   NITRITE UA  Negative   BILIRUBIN UA  Negative   UROBILINOGEN UA E U /dl 0 2   LEUKOCYTES UA  Negative     Results from last 7 days   Lab Units 07/20/20  2220   AMPH/METH  Negative   BARBITURATE UR  Negative   BENZODIAZEPINE UR  Negative   COCAINE UR  Negative   METHADONE URINE  Negative   OPIATE UR  Negative   PCP UR  Negative   THC UR  Positive*     ED Treatment:   Medication Administration from 07/20/2020 1656 to 07/21/2020 4777       Date/Time Order Dose Route Action     07/20/2020 1713 sodium chloride 0 9 % bolus 1,000 mL 1,000 mL Intravenous New Bag     07/20/2020 1724 sucralfate (CARAFATE) oral suspension 1,000 mg 1,000 mg Oral Given     07/20/2020 1723 OLANZapine (ZyPREXA) IM injection 5 mg 5 mg Intramuscular Given     07/20/2020 1724 sterile water injection **ADS Override Pull** 2 1 mL  Given     07/20/2020 1927 sodium chloride 0 9 % bolus 1,000 mL 1,000 mL Intravenous New Bag     07/20/2020 2002 [facility]ansetron [facility]) injection 4 mg 4 mg Intravenous Given     07/20/2020 2204 LORazepam (ATIVAN) injection 0 5 mg 0 5 mg Intravenous Given     07/21/2020 0710 sodium chloride 0 9 % infusion 125 mL/hr Intravenous New Bag     07/20/2020 2211 sodium chloride 0 9 % infusion 125 mL/hr Intravenous New Bag     07/20/2020 2215 pantoprazole (PROTONIX) injection 40 mg 40 mg Intravenous Given     07/20/2020 2218 ceftriaxone (ROCEPHIN) 1 g/50 mL in dextrose IVPB 1,000 mg Intravenous New Bag     07/21/2020 0555 metroNIDAZOLE (FLAGYL) IVPB (premix) 500 mg 100 mL 500 mg Intravenous New Bag     07/20/2020 2244 metroNIDAZOLE (FLAGYL) IVPB (premix) 500 mg 100 mL 500 mg Intravenous New Bag        Past Medical History:   Diagnosis Date    Asthma     Chronic pain     back pain     Present on Admission:   Lactic acidosis   Leukocytosis   Marijuana abuse   Tobacco abuse      Admitting Diagnosis: Lactic acidosis [E87 2]  Colitis [K52 9]  Marijuana abuse [F12 10]  Epigastric pain [R10 13]  Abdominal pain [R10 9]  Nausea and vomiting [R11 2]  Age/Sex: 47 y o  male  Admission Orders:  Scheduled Medications:    Medications:  acetaminophen 650 mg Oral Once   cefTRIAXone 1,000 mg Intravenous Q24H   metroNIDAZOLE 500 mg Intravenous Q8H   nicotine 7 mg Transdermal Daily   pantoprazole 40 mg Intravenous Q12H [address]     Continuous IV Infusions:    sodium chloride 125 mL/hr Intravenous Continuous     PRN Meds:    acetaminophen 650 mg Oral Q6H PRN   albuterol 2 puff Inhalation Q4H PRN   ondansetron 4 mg Intravenous Q8H PRN     NPO   Act as marty     IP CONSULT TO GASTROENTEROLOGY  IP CONSULT TO PSYCHIATRY    Network Utilization Review Department  [email or web address]  ATTENTION: Please call with any questions or concerns to [phone] and carefully listen to the prompts so that you are directed to the right person   All voicemails are confidential   [place] Doing all requests for admission clinical and any other requests to dedicated fax number below belonging to the campus where the patient is receiving treatment   List of dedicated fax numbers for the Facilities:  [address] DENIALS (Administrative/Medical Necessity) [phone]   [address]  (Maternity/NICU/Pediatrics) [phone]   Jenny Khan [phone]   Javier Rick [phone]   Valdemar Romo [phone]   Christen Bucio [phone]   [address] [place] [address] [phone]   [facility]  [phone]   [address] [facility]  [address] [facility] [address] [facility] [phone]

## 2020-07-24 PROBLEM — F41.9 ANXIETY: Status: ACTIVE | Noted: 2020-07-24

## 2020-07-24 LAB
ATRIAL RATE: 59 BPM
P AXIS: 23 DEGREES
PR INTERVAL: 168 MS
QRS AXIS: 84 DEGREES
QRSD INTERVAL: 92 MS
QT INTERVAL: 428 MS
QTC INTERVAL: 423 MS
T WAVE AXIS: 65 DEGREES
VENTRICULAR RATE: 59 BPM

## 2020-07-24 PROCEDURE — 93010 ELECTROCARDIOGRAM REPORT: CPT | Performed by: INTERNAL MEDICINE

## 2020-07-24 NOTE — ASSESSMENT & PLAN NOTE
· POA, WBC is around 11 and improving  · Appears to be chronic on review of records  · Could be reactive secondary to dehydration/nausea and vomiting     ·

## 2020-07-24 NOTE — ASSESSMENT & PLAN NOTE
Initiated Lexapro, as per recommendations of the psychiatrist   Discussed with the patient possible side effects, including use suicidal ideations  Patient is planning to see a psychiatrist outside of the hospital   Will avoid benzodiazepines

## 2020-07-24 NOTE — DISCHARGE SUMMARY
Discharge- Alexander Ashford 1980, 47 y o  male MRN: [id number]    Unit/Bed#: -01 Encounter: [id number]    Primary Care Provider: Richi Blakely MD   Date and time admitted to hospital: 7/20/2020  4:57 PM        Anxiety  Assessment & Plan  Initiated Lexapro, as per recommendations of the psychiatrist   Discussed with the patient possible side effects, including use suicidal ideations  Patient is planning to see a psychiatrist outside of the hospital   Will avoid benzodiazepines  Asthma  Assessment & Plan  · Mild intermittent  · Continue p r n  Albuterol  · Not currently in acute exacerbation    Panic attack  Assessment & Plan  · Patient reports history of anxiety, however he is not prescribed any medication for this outpatient  · Consulted psychiatry and they recommend p r n  Anxiolytics while in the hospital with close observation  ·  UDS positive for THC  · Monitor mood closely     Testicular abnormality  Assessment & Plan  · Incidental finding noted on CT scan - increased soft tissue density in the right testicular region  · Testicular ultrasound was normal    ISAURO (acute kidney injury) ([facility] [address] )  Assessment & Plan  · POA, creatinine 1 14    It is currently improved at 0 9  · Baseline creatinine appears to be around 0 8 on review  · Likely in setting of dehydration and GI loss  · Avoid nephrotoxic agents and hypotension    Tobacco abuse  Assessment & Plan  · Reports quitting cigarettes, does chew tobacco  · Placed on nicotine patch here  · Encouraged cessation    Marijuana abuse  Assessment & Plan  · Patient reports smoking marijuana frequently at home  · Last use was a few days ago  · Encouraged cessation    · Could be contributing to patient's vomiting symptoms    Leukocytosis  Assessment & Plan  · POA, WBC is around 11 and improving  · Appears to be chronic on review of records  · Could be reactive secondary to dehydration/nausea and vomiting     ·     Lactic acidosis  Assessment & Plan  Resolved    * Abdominal pain  Assessment & Plan  · Pt presenting with epigastric abdominal pain, N/V/D x3 days  · Has been to the ER multiple times for marijuana related cyclical vomiting syndrome  · Placed on Protonix and with the addition of Bentyl  · GI has seen the patient and proceeded with EGD which revealed GE junction erosion and inflammation  · Continue Protonix  · Encourage marijuana cessation        Discharging Physician / Practitioner: Serenity Fox MD  PCP: Steven Clinton MD  Admission Date:   Admission Orders (From admission, onward)     Ordered        07/22/20 1444  Inpatient Admission  Once         07/20/20 2117  Place in Observation (expected length of stay for this patient is less than two midnights)  Once                   Discharge Date: 07/23/20    Resolved Problems  Date Reviewed: 7/24/2020    None          Consultations During Hospital Stay:  · Psychiatry  · GI    Procedures Performed:   · EGD    Significant Findings / Test Results:   EGD: Z line 44 cm from the incisors  · Mild eroded and erythematous mucosa in the GE junction; performed cold biopsy  · The stomach appeared normal  · Performed multiple biopsies in the body of the stomach and antrum  · The duodenum appeared normal   · Performed multiple biopsies in the 1st part of the duodenum and 2nd part of the duodenum  CT abdomen on 7/20:Wall thickening of the transverse colon and descending colon could relate to underdistention or colitis  Increased soft tissue density in the right testicular region, correlate with testicular ultrasound     CT abdomen on 07/22:Mild wall thickening again noted in the descending colon with submucosal hypodensity, stable with no surrounding inflammatory disease       Incidental Findings:   · As above     Test Results Pending at Discharge (will require follow up):    · Tissue biopsy     Outpatient Tests Requested:  · None    Complications:  None    Reason for Admission:  Abdominal pain    Hospital Course:     Irene Edouard is a 47 y o  male patient who originally presented to the hospital on 7/20/2020 due to abdominal pain, nausea and vomiting x3 days  Patient is extremely anxious during my encounter  He reports that he is having an anxiety attack  States that he does have anxiety at home but does not typically take medications for it  Reports that he just wants to take a hot shower and that his abdominal pain is severe right now  He reports that the abdominal pain started a few days ago but today it got much worse  It is located in the epigastric region  He also reports nausea and vomiting in addition to significant diarrhea  He does report smoking marijuana and last use was also a few days ago which he describes as a small amount  He also reports that he had a sip of alcohol last night and does have history of chewing tobacco   He reports that the only medication he is on at home is his albuterol inhaler  Was previously prescribed GI medications including Bentyl, Protonix, omeprazole but he reports that he has not followed up to get refills on these prescriptions  He does not take any medications on a daily basis  He was also reporting chills  During the hospital stay, patient was seen by the gastroenterologist who performed stool testing, including C diff, which was negative  He then proceeded with EGD which was unremarkable except for mild erythema in the GE junction  Patient continues to have occasional abdominal discomfort  Started on Protonix 40 mg daily and Bentyl with meals  Initially, CT of the abdomen revealed suspicion for colitis and testicular abnormalities  Clinically, patient did not have colitis and antibiotics were stopped  Eventually, a repeat CT of the abdomen was done and did not support the persistence of colitis  Patient had testicular ultrasound done which was normal    Also, patient has been complaining of anxiety and panic attacks    He required benzodiazepines during the hospitalization  He was seen by the psychiatrist and was advised to start antidepressant  Started Lexapro and recommend outpatient follow-up  Patient was strongly advised to avoid tobacco products as well as abstain from cannabis  Please see above list of diagnoses and related plan for additional information  Condition at Discharge: stable     Discharge Day Visit / Exam:     Subjective:  Patient was seen and examined  He reports feeling much better this morning  He states that he feels more relaxed and less anxious today  He denies any abdominal discomfort now  Vitals: Blood Pressure: 140/82 (07/23/20 0800)  Pulse: 81 (07/23/20 0800)  Temperature: 97 9 °F (36 6 °C) (07/23/20 0800)  Temp Source: Oral (07/22/20 2332)  Respirations: 20 (07/23/20 0800)  Height: 6' 3" (190 5 cm) (07/21/20 1647)  Weight - Scale: 107 kg (235 lb 14 3 oz) (07/21/20 0930)  SpO2: 98 % (07/23/20 0800)  Exam:   Physical Exam   Neck: Normal range of motion  Cardiovascular: Normal rate, regular rhythm and normal heart sounds  Pulmonary/Chest: Effort normal and breath sounds normal  No respiratory distress  Abdominal: Soft  There is no tenderness  Musculoskeletal: He exhibits no edema or deformity  Neurological: He is alert  Skin: Skin is warm  No rash noted  No erythema  Psychiatric: He has a normal mood and affect  Discussion with Family:  No    Discharge instructions/Information to patient and family:   See after visit summary for information provided to patient and family  Provisions for Follow-Up Care:  See after visit summary for information related to follow-up care and any pertinent home health orders  Disposition:     Home    For Discharges to [facility]:   · Not Applicable to this Patient - Not Applicable to this Patient    Planned Readmission: no     Discharge Statement:  I spent 40 minutes discharging the patient  This time was spent on the day of discharge   I had direct contact with the patient on the day of discharge  Greater than 50% of the total time was spent examining patient, answering all patient questions, arranging and discussing plan of care with patient as well as directly providing post-discharge instructions  Additional time then spent on discharge activities  Discharge Medications:  See after visit summary for reconciled discharge medications provided to patient and family        ** Please Note: This note has been constructed using a voice recognition system **

## 2020-07-24 NOTE — UTILIZATION REVIEW
Notification of Discharge  This is a Notification of Discharge from our facility [address]  Please be advised that this patient has been discharge from our facility  Below you will find the admission and discharge date and time including the patients disposition  PRESENTATION DATE: 7/20/2020  4:57 PM  OBS ADMISSION DATE:   IP ADMISSION DATE: 7/22/20 1444   DISCHARGE DATE: 7/23/2020 11:06 AM  DISPOSITION: Home/Self Care Home/Self Care   Admission Orders listed below:  Admission Orders (From admission, onward)     Ordered        07/22/20 1444  Inpatient Admission  Once         07/20/20 2117  Place in Observation (expected length of stay for this patient is less than two midnights)  Once                   Please contact the UR Department if additional information is required to close this patient's authorization/case  [address] [facility] Utilization Review Department  Main: [phone] x carefully listen to the prompts  All voicemails are confidential   [email or web address]  Send all requests for admission clinical reviews, approved or denied determinations and any other requests to dedicated fax number below belonging to the campus where the patient is receiving treatment   List of dedicated fax numbers:  [address] [place] [address] DENIALS (Administrative/Medical Necessity) [phone]   [address]  (Maternity/NICU/Pediatrics) [phone]   [facility] [phone]   [facility] [phone]   Damien Hudson [phone]   [facility] [address] [facility] [phone]   [facility] [phone]   [address] [facility] [address] [facility]  [address] [facility] [address] [facility] [phone]

## 2020-07-24 NOTE — ASSESSMENT & PLAN NOTE
· POA, creatinine 1 14    It is currently improved at 0 9  · Baseline creatinine appears to be around 0 8 on review  · Likely in setting of dehydration and GI loss  · Avoid nephrotoxic agents and hypotension

## 2020-07-24 NOTE — ASSESSMENT & PLAN NOTE
· Pt presenting with epigastric abdominal pain, N/V/D x3 days  · Has been to the ER multiple times for marijuana related cyclical vomiting syndrome  · Placed on Protonix and with the addition of Bentyl  · GI has seen the patient and proceeded with EGD which revealed GE junction erosion and inflammation  · Continue Protonix  · Encourage marijuana cessation

## 2020-07-27 LAB — CALPROTECTIN STL-MCNT: 23 UG/G (ref 0–120)

## 2022-07-25 ENCOUNTER — HOSPITAL ENCOUNTER (EMERGENCY)
Facility: HOSPITAL | Age: 42
Discharge: HOME | End: 2022-07-25
Attending: EMERGENCY MEDICINE
Payer: COMMERCIAL

## 2022-07-25 ENCOUNTER — APPOINTMENT (EMERGENCY)
Dept: RADIOLOGY | Facility: HOSPITAL | Age: 42
End: 2022-07-25
Payer: COMMERCIAL

## 2022-07-25 VITALS
HEART RATE: 88 BPM | DIASTOLIC BLOOD PRESSURE: 75 MMHG | SYSTOLIC BLOOD PRESSURE: 126 MMHG | TEMPERATURE: 97.9 F | RESPIRATION RATE: 18 BRPM | OXYGEN SATURATION: 97 %
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
## 2022-07-25 PROCEDURE — 99284 EMERGENCY DEPT VISIT MOD MDM: CPT | Performed by: EMERGENCY MEDICINE

## 2022-07-25 PROCEDURE — 99283 EMERGENCY DEPT VISIT LOW MDM: CPT

## 2022-07-25 PROCEDURE — 73140 X-RAY EXAM OF FINGER(S): CPT

## 2022-07-25 RX ORDER — HYDROCODONE BITARTRATE AND ACETAMINOPHEN 5; 325 MG/1; MG/1
1 TABLET ORAL EVERY 6 HOURS PRN
Qty: 12 TABLET | Refills: 0 | Status: SHIPPED | OUTPATIENT
Start: 2022-07-25 | End: 2022-08-04

## 2022-07-25 RX ORDER — AMOXICILLIN AND CLAVULANATE POTASSIUM 875; 125 MG/1; MG/1
1 TABLET, FILM COATED ORAL ONCE
Status: COMPLETED | OUTPATIENT
Start: 2022-07-25 | End: 2022-07-25

## 2022-07-25 RX ORDER — HYDROCODONE BITARTRATE AND ACETAMINOPHEN 5; 325 MG/1; MG/1
1 TABLET ORAL ONCE
Status: COMPLETED | OUTPATIENT
Start: 2022-07-25 | End: 2022-07-25

## 2022-07-25 RX ORDER — AMOXICILLIN AND CLAVULANATE POTASSIUM 875; 125 MG/1; MG/1
1 TABLET, FILM COATED ORAL EVERY 12 HOURS
Qty: 24 TABLET | Refills: 0 | Status: SHIPPED | OUTPATIENT
Start: 2022-07-25 | End: 2022-08-06

## 2022-07-25 RX ADMIN — HYDROCODONE BITARTRATE AND ACETAMINOPHEN 1 TABLET: 5; 325 TABLET ORAL at 02:46

## 2022-07-25 RX ADMIN — AMOXICILLIN AND CLAVULANATE POTASSIUM 1 TABLET: 875; 125 TABLET, FILM COATED ORAL at 02:46

## 2022-07-25 NOTE — ED PROVIDER NOTES
History  Chief Complaint   Patient presents with    Dog Bite     Pt arrived ambulatory with c/o left hand injury, pts dog was having a seizure and when he rendered aid, the dog bit him  Dog is up to date on his shots     This patient was having seizure and patient was trying to take care of the head when the pad reactively bit him on his left index finger  He has some puncture wounds and has a lot of pain to range of motion and some swelling  The injury happened less than an hour prior to presentation  Pain moderate to severe, sharp, constant          Prior to Admission Medications   Prescriptions Last Dose Informant Patient Reported? Taking? albuterol (PROVENTIL HFA,VENTOLIN HFA) 90 mcg/act inhaler   No No   Sig: Inhale 2 puffs every 4 (four) hours as needed for wheezing   dicyclomine (BENTYL) 10 mg capsule   No No   Sig: Take 1 capsule (10 mg total) by mouth every 6 (six) hours as needed (abdominal cramps)   escitalopram (LEXAPRO) 10 mg tablet   No No   Sig: Take 1 tablet (10 mg total) by mouth daily   pantoprazole (PROTONIX) 40 mg tablet   No No   Sig: Take 1 tablet (40 mg total) by mouth daily      Facility-Administered Medications: None       Past Medical History:   Diagnosis Date    Asthma     Chronic pain     back pain       History reviewed  No pertinent surgical history  History reviewed  No pertinent family history  I have reviewed and agree with the history as documented  E-Cigarette/Vaping     E-Cigarette/Vaping Substances     Social History     Tobacco Use    Smoking status: Former Smoker     Packs/day: 0 25    Smokeless tobacco: Never Used    Tobacco comment: quit 10/27   Substance Use Topics    Alcohol use: Never     Alcohol/week: 0 0 standard drinks    Drug use: Yes     Frequency: 1 0 times per week     Types: Marijuana       Review of Systems   Constitutional: Negative for chills and fever  HENT: Negative for ear pain and sore throat      Eyes: Negative for pain and visual disturbance  Respiratory: Negative for cough and shortness of breath  Cardiovascular: Negative for chest pain and palpitations  Gastrointestinal: Negative for abdominal pain and vomiting  Genitourinary: Negative for dysuria and hematuria  Musculoskeletal: Negative for arthralgias and back pain  Skin: Negative for color change and rash  Neurological: Negative for seizures and syncope  All other systems reviewed and are negative  Physical Exam  Physical Exam  Vitals and nursing note reviewed  Constitutional:       Appearance: He is well-developed  HENT:      Head: Normocephalic and atraumatic  Eyes:      Conjunctiva/sclera: Conjunctivae normal    Cardiovascular:      Rate and Rhythm: Normal rate and regular rhythm  Heart sounds: No murmur heard  Pulmonary:      Effort: Pulmonary effort is normal  No respiratory distress  Breath sounds: Normal breath sounds  Abdominal:      Palpations: Abdomen is soft  Tenderness: There is no abdominal tenderness  Musculoskeletal:      Cervical back: Neck supple  Comments: Patient has abrasions and puncture wounds to the left index finger  Decreased range of motion secondary to pain  Some swelling and ecchymosis on the palmar side of the PIP  Skin:     General: Skin is warm and dry  Capillary Refill: Capillary refill takes less than 2 seconds  Comments: See note describing the wounds on the left index finger  Neurological:      General: No focal deficit present  Mental Status: He is alert and oriented to person, place, and time     Psychiatric:         Mood and Affect: Mood normal          Behavior: Behavior normal          Vital Signs  ED Triage Vitals [07/25/22 0229]   Temperature Pulse Respirations Blood Pressure SpO2   97 9 °F (36 6 °C) 88 18 126/75 97 %      Temp Source Heart Rate Source Patient Position - Orthostatic VS BP Location FiO2 (%)   Oral Monitor Sitting Right arm --      Pain Score       -- Vitals:    07/25/22 0229   BP: 126/75   Pulse: 88   Patient Position - Orthostatic VS: Sitting         Visual Acuity      ED Medications  Medications   amoxicillin-clavulanate (AUGMENTIN) 875-125 mg per tablet 1 tablet (has no administration in time range)   HYDROcodone-acetaminophen (NORCO) 5-325 mg per tablet 1 tablet (has no administration in time range)       Diagnostic Studies  Results Reviewed     None                 XR finger second digit-index LEFT    (Results Pending)              Procedures  Procedures         ED Course                                             MDM  Number of Diagnoses or Management Options     Amount and/or Complexity of Data Reviewed  Independent visualization of images, tracings, or specimens: yes    Risk of Complications, Morbidity, and/or Mortality  General comments: Neg xray    Patient Progress  Patient progress: stable      Disposition  Final diagnoses:   Dog bite, initial encounter     Time reflects when diagnosis was documented in both MDM as applicable and the Disposition within this note     Time User Action Codes Description Comment    7/25/2022  2:40 AM Memo Maravilla Add Ranjana Ling  0XXA] Dog bite, initial encounter       ED Disposition     None      Follow-up Information    None         Patient's Medications   Discharge Prescriptions    No medications on file       No discharge procedures on file      PDMP Review     None          ED Provider  Electronically Signed by           Sada Oneill MD  07/25/22 6980

## 2022-12-01 NOTE — PLAN OF CARE
December 1, 2022      Renate Sheikh  [address]  [facility] [address]        To Whom It May Concern:    Renate Sheikh  was seen on 12/1/22.  I recommended she stay home from school until she is fever free for at least 24 hours and otherwise feeling better.   Please reach out with any concerns.       Sincerely,        KM Falk     Problem: Potential for Falls  Goal: Patient will remain free of falls  Description  INTERVENTIONS:  - Assess patient frequently for physical needs  -  Identify cognitive and physical deficits and behaviors that affect risk of falls    -  Patrick fall precautions as indicated by assessment   - Educate patient/family on patient safety including physical limitations  - Instruct patient to call for assistance with activity based on assessment  - Modify environment to reduce risk of injury  - Consider OT/PT consult to assist with strengthening/mobility  Outcome: Progressing     Problem: PAIN - ADULT  Goal: Verbalizes/displays adequate comfort level or baseline comfort level  Description  Interventions:  - Encourage patient to monitor pain and request assistance  - Assess pain using appropriate pain scale  - Administer analgesics based on type and severity of pain and evaluate response  - Implement non-pharmacological measures as appropriate and evaluate response  - Consider cultural and social influences on pain and pain management  - Notify physician/advanced practitioner if interventions unsuccessful or patient reports new pain  Outcome: Progressing     Problem: INFECTION - ADULT  Goal: Absence or prevention of progression during hospitalization  Description  INTERVENTIONS:  - Assess and monitor for signs and symptoms of infection  - Monitor lab/diagnostic results  - Monitor all insertion sites, i e  indwelling lines, tubes, and drains  - Monitor endotracheal if appropriate and nasal secretions for changes in amount and color  - Patrick appropriate cooling/warming therapies per order  - Administer medications as ordered  - Instruct and encourage patient and family to use good hand hygiene technique  - Identify and instruct in appropriate isolation precautions for identified infection/condition  Outcome: Progressing  Goal: Absence of fever/infection during neutropenic period  Description  INTERVENTIONS:  - Monitor WBC    Outcome: Progressing     Problem: SAFETY ADULT  Goal: Maintain or return to baseline ADL function  Description  INTERVENTIONS:  -  Assess patient's ability to carry out ADLs; assess patient's baseline for ADL function and identify physical deficits which impact ability to perform ADLs (bathing, care of mouth/teeth, toileting, grooming, dressing, etc )  - Assess/evaluate cause of self-care deficits   - Assess range of motion  - Assess patient's mobility; develop plan if impaired  - Assess patient's need for assistive devices and provide as appropriate  - Encourage maximum independence but intervene and supervise when necessary  - Involve family in performance of ADLs  - Assess for home care needs following discharge   - Consider OT consult to assist with ADL evaluation and planning for discharge  - Provide patient education as appropriate  Outcome: Progressing  Goal: Maintain or return mobility status to optimal level  Description  INTERVENTIONS:  - Assess patient's baseline mobility status (ambulation, transfers, stairs, etc )    - Identify cognitive and physical deficits and behaviors that affect mobility  - Identify mobility aids required to assist with transfers and/or ambulation (gait belt, sit-to-stand, lift, walker, cane, etc )  - Minter fall precautions as indicated by assessment  - Record patient progress and toleration of activity level on Mobility SBAR; progress patient to next Phase/Stage  - Instruct patient to call for assistance with activity based on assessment  - Consider rehabilitation consult to assist with strengthening/weightbearing, etc   Outcome: Progressing     Problem: DISCHARGE PLANNING  Goal: Discharge to home or other facility with appropriate resources  Description  INTERVENTIONS:  - Identify barriers to discharge w/patient and caregiver  - Arrange for needed discharge resources and transportation as appropriate  - Identify discharge learning needs (meds, wound care, etc )  - Arrange for interpretive services to assist at discharge as needed  - Refer to Case Management Department for coordinating discharge planning if the patient needs post-hospital services based on physician/advanced practitioner order or complex needs related to functional status, cognitive ability, or social support system  Outcome: Progressing     Problem: Knowledge Deficit  Goal: Patient/family/caregiver demonstrates understanding of disease process, treatment plan, medications, and discharge instructions  Description  Complete learning assessment and assess knowledge base    Interventions:  - Provide teaching at level of understanding  - Provide teaching via preferred learning methods  Outcome: Progressing

## 2024-03-06 ENCOUNTER — APPOINTMENT (EMERGENCY)
Dept: CT IMAGING | Facility: HOSPITAL | Age: 44
End: 2024-03-06
Payer: COMMERCIAL

## 2024-03-06 ENCOUNTER — HOSPITAL ENCOUNTER (OUTPATIENT)
Facility: HOSPITAL | Age: 44
Setting detail: OBSERVATION
Discharge: LEFT BEFORE DISCHARGE | End: 2024-03-06
Attending: EMERGENCY MEDICINE | Admitting: STUDENT IN AN ORGANIZED HEALTH CARE EDUCATION/TRAINING PROGRAM
Payer: COMMERCIAL

## 2024-03-06 VITALS
OXYGEN SATURATION: 100 % | HEART RATE: 68 BPM | TEMPERATURE: 98 F | DIASTOLIC BLOOD PRESSURE: 77 MMHG | RESPIRATION RATE: 18 BRPM | SYSTOLIC BLOOD PRESSURE: 140 MMHG

## 2024-03-06 DIAGNOSIS — F41.9 ANXIETY: ICD-10-CM

## 2024-03-06 DIAGNOSIS — R11.2 INTRACTABLE VOMITING WITH NAUSEA: Primary | ICD-10-CM

## 2024-03-06 LAB
ALBUMIN SERPL BCP-MCNC: 4.3 G/DL (ref 3.5–5)
ALP SERPL-CCNC: 63 U/L (ref 34–104)
ALT SERPL W P-5'-P-CCNC: 20 U/L (ref 7–52)
ANION GAP SERPL CALCULATED.3IONS-SCNC: 9 MMOL/L
AST SERPL W P-5'-P-CCNC: 13 U/L (ref 13–39)
BASOPHILS # BLD AUTO: 0.07 THOUSANDS/ÂΜL (ref 0–0.1)
BASOPHILS NFR BLD AUTO: 1 % (ref 0–1)
BILIRUB SERPL-MCNC: 0.81 MG/DL (ref 0.2–1)
BILIRUB UR QL STRIP: NEGATIVE
BUN SERPL-MCNC: 13 MG/DL (ref 5–25)
CALCIUM SERPL-MCNC: 9.3 MG/DL (ref 8.4–10.2)
CHLORIDE SERPL-SCNC: 106 MMOL/L (ref 96–108)
CLARITY UR: CLEAR
CO2 SERPL-SCNC: 23 MMOL/L (ref 21–32)
COLOR UR: NORMAL
CREAT SERPL-MCNC: 0.92 MG/DL (ref 0.6–1.3)
EOSINOPHIL # BLD AUTO: 0.13 THOUSAND/ÂΜL (ref 0–0.61)
EOSINOPHIL NFR BLD AUTO: 1 % (ref 0–6)
ERYTHROCYTE [DISTWIDTH] IN BLOOD BY AUTOMATED COUNT: 13.5 % (ref 11.6–15.1)
GFR SERPL CREATININE-BSD FRML MDRD: 102 ML/MIN/1.73SQ M
GLUCOSE SERPL-MCNC: 114 MG/DL (ref 65–140)
GLUCOSE UR STRIP-MCNC: NEGATIVE MG/DL
HCT VFR BLD AUTO: 43.7 % (ref 36.5–49.3)
HGB BLD-MCNC: 14.8 G/DL (ref 12–17)
HGB UR QL STRIP.AUTO: NEGATIVE
IMM GRANULOCYTES # BLD AUTO: 0.08 THOUSAND/UL (ref 0–0.2)
IMM GRANULOCYTES NFR BLD AUTO: 1 % (ref 0–2)
KETONES UR STRIP-MCNC: NEGATIVE MG/DL
LACTATE SERPL-SCNC: 1.5 MMOL/L (ref 0.5–2)
LACTATE SERPL-SCNC: 3.2 MMOL/L (ref 0.5–2)
LEUKOCYTE ESTERASE UR QL STRIP: NEGATIVE
LIPASE SERPL-CCNC: 12 U/L (ref 11–82)
LYMPHOCYTES # BLD AUTO: 2.21 THOUSANDS/ÂΜL (ref 0.6–4.47)
LYMPHOCYTES NFR BLD AUTO: 18 % (ref 14–44)
MCH RBC QN AUTO: 27.2 PG (ref 26.8–34.3)
MCHC RBC AUTO-ENTMCNC: 33.9 G/DL (ref 31.4–37.4)
MCV RBC AUTO: 80 FL (ref 82–98)
MONOCYTES # BLD AUTO: 0.75 THOUSAND/ÂΜL (ref 0.17–1.22)
MONOCYTES NFR BLD AUTO: 6 % (ref 4–12)
NEUTROPHILS # BLD AUTO: 9.32 THOUSANDS/ÂΜL (ref 1.85–7.62)
NEUTS SEG NFR BLD AUTO: 73 % (ref 43–75)
NITRITE UR QL STRIP: NEGATIVE
NRBC BLD AUTO-RTO: 0 /100 WBCS
PH UR STRIP.AUTO: 7.5 [PH]
PLATELET # BLD AUTO: 325 THOUSANDS/UL (ref 149–390)
PMV BLD AUTO: 9.6 FL (ref 8.9–12.7)
POTASSIUM SERPL-SCNC: 3.6 MMOL/L (ref 3.5–5.3)
PROT SERPL-MCNC: 7.2 G/DL (ref 6.4–8.4)
PROT UR STRIP-MCNC: NEGATIVE MG/DL
RBC # BLD AUTO: 5.45 MILLION/UL (ref 3.88–5.62)
SODIUM SERPL-SCNC: 138 MMOL/L (ref 135–147)
SP GR UR STRIP.AUTO: 1.02 (ref 1–1.03)
UROBILINOGEN UR STRIP-ACNC: <2 MG/DL
WBC # BLD AUTO: 12.56 THOUSAND/UL (ref 4.31–10.16)

## 2024-03-06 PROCEDURE — 83690 ASSAY OF LIPASE: CPT

## 2024-03-06 PROCEDURE — 99223 1ST HOSP IP/OBS HIGH 75: CPT | Performed by: STUDENT IN AN ORGANIZED HEALTH CARE EDUCATION/TRAINING PROGRAM

## 2024-03-06 PROCEDURE — 81003 URINALYSIS AUTO W/O SCOPE: CPT

## 2024-03-06 PROCEDURE — 36415 COLL VENOUS BLD VENIPUNCTURE: CPT

## 2024-03-06 PROCEDURE — 74177 CT ABD & PELVIS W/CONTRAST: CPT

## 2024-03-06 PROCEDURE — 96361 HYDRATE IV INFUSION ADD-ON: CPT

## 2024-03-06 PROCEDURE — 99285 EMERGENCY DEPT VISIT HI MDM: CPT

## 2024-03-06 PROCEDURE — 83605 ASSAY OF LACTIC ACID: CPT

## 2024-03-06 PROCEDURE — 96365 THER/PROPH/DIAG IV INF INIT: CPT

## 2024-03-06 PROCEDURE — 80053 COMPREHEN METABOLIC PANEL: CPT

## 2024-03-06 PROCEDURE — 85025 COMPLETE CBC W/AUTO DIFF WBC: CPT

## 2024-03-06 PROCEDURE — 96375 TX/PRO/DX INJ NEW DRUG ADDON: CPT

## 2024-03-06 PROCEDURE — 96372 THER/PROPH/DIAG INJ SC/IM: CPT

## 2024-03-06 PROCEDURE — 99284 EMERGENCY DEPT VISIT MOD MDM: CPT

## 2024-03-06 RX ORDER — DICYCLOMINE HYDROCHLORIDE 10 MG/1
10 CAPSULE ORAL EVERY 6 HOURS PRN
Status: DISCONTINUED | OUTPATIENT
Start: 2024-03-06 | End: 2024-03-06 | Stop reason: HOSPADM

## 2024-03-06 RX ORDER — SODIUM CHLORIDE 9 MG/ML
125 INJECTION, SOLUTION INTRAVENOUS CONTINUOUS
Status: DISCONTINUED | OUTPATIENT
Start: 2024-03-06 | End: 2024-03-06 | Stop reason: HOSPADM

## 2024-03-06 RX ORDER — HYDROXYZINE HYDROCHLORIDE 25 MG/1
25 TABLET, FILM COATED ORAL EVERY 6 HOURS PRN
Status: DISCONTINUED | OUTPATIENT
Start: 2024-03-06 | End: 2024-03-06 | Stop reason: HOSPADM

## 2024-03-06 RX ORDER — ACETAMINOPHEN 325 MG/1
650 TABLET ORAL EVERY 6 HOURS PRN
Status: DISCONTINUED | OUTPATIENT
Start: 2024-03-06 | End: 2024-03-06 | Stop reason: HOSPADM

## 2024-03-06 RX ORDER — ESCITALOPRAM OXALATE 10 MG/1
10 TABLET ORAL DAILY
Status: DISCONTINUED | OUTPATIENT
Start: 2024-03-07 | End: 2024-03-06

## 2024-03-06 RX ORDER — HALOPERIDOL 5 MG/ML
5 INJECTION INTRAMUSCULAR ONCE
Status: COMPLETED | OUTPATIENT
Start: 2024-03-06 | End: 2024-03-06

## 2024-03-06 RX ORDER — DICYCLOMINE HCL 20 MG
20 TABLET ORAL ONCE
Status: COMPLETED | OUTPATIENT
Start: 2024-03-06 | End: 2024-03-06

## 2024-03-06 RX ORDER — PANTOPRAZOLE SODIUM 40 MG/1
40 TABLET, DELAYED RELEASE ORAL DAILY
Status: DISCONTINUED | OUTPATIENT
Start: 2024-03-07 | End: 2024-03-06 | Stop reason: HOSPADM

## 2024-03-06 RX ORDER — ONDANSETRON 2 MG/ML
4 INJECTION INTRAMUSCULAR; INTRAVENOUS EVERY 6 HOURS PRN
Status: DISCONTINUED | OUTPATIENT
Start: 2024-03-06 | End: 2024-03-06 | Stop reason: HOSPADM

## 2024-03-06 RX ORDER — MAGNESIUM HYDROXIDE/ALUMINUM HYDROXICE/SIMETHICONE 120; 1200; 1200 MG/30ML; MG/30ML; MG/30ML
30 SUSPENSION ORAL EVERY 6 HOURS PRN
Status: DISCONTINUED | OUTPATIENT
Start: 2024-03-06 | End: 2024-03-06 | Stop reason: HOSPADM

## 2024-03-06 RX ORDER — ONDANSETRON 2 MG/ML
4 INJECTION INTRAMUSCULAR; INTRAVENOUS ONCE
Status: COMPLETED | OUTPATIENT
Start: 2024-03-06 | End: 2024-03-06

## 2024-03-06 RX ORDER — HALOPERIDOL 5 MG/ML
2 INJECTION INTRAMUSCULAR ONCE
Status: DISCONTINUED | OUTPATIENT
Start: 2024-03-06 | End: 2024-03-06

## 2024-03-06 RX ADMIN — DICYCLOMINE HYDROCHLORIDE 20 MG: 20 TABLET ORAL at 12:54

## 2024-03-06 RX ADMIN — IOHEXOL 100 ML: 350 INJECTION, SOLUTION INTRAVENOUS at 10:37

## 2024-03-06 RX ADMIN — ONDANSETRON 4 MG: 2 INJECTION INTRAMUSCULAR; INTRAVENOUS at 13:03

## 2024-03-06 RX ADMIN — SODIUM CHLORIDE, SODIUM LACTATE, POTASSIUM CHLORIDE, AND CALCIUM CHLORIDE 1000 ML: .6; .31; .03; .02 INJECTION, SOLUTION INTRAVENOUS at 11:37

## 2024-03-06 RX ADMIN — SODIUM CHLORIDE 1000 ML: 0.9 INJECTION, SOLUTION INTRAVENOUS at 10:04

## 2024-03-06 RX ADMIN — HALOPERIDOL LACTATE 5 MG: 5 INJECTION, SOLUTION INTRAMUSCULAR at 09:59

## 2024-03-06 RX ADMIN — HALOPERIDOL LACTATE 5 MG: 5 INJECTION, SOLUTION INTRAMUSCULAR at 13:53

## 2024-03-06 NOTE — H&P
[facility]  H&P  Name: Lopez Norman 43 y.o. male I MRN: [id number]  Unit/Bed#: Z1H4 I Date of Admission: 3/6/2024   Date of Service: 3/6/2024 I Hospital Day: 0      Assessment/Plan   * Abdominal pain  Assessment & Plan  Nausea vomiting and abdominal pain ongoing for approximately 1 week.  Was seen outpatient at an urgent care and started on Bentyl and steroids with minimal to no improvement.  Presented to the ED today as he was unable to tolerate any p.o. intake    Clear liquid diet  Likely a component of cannabinoid hyperemesis syndrome  Allow outpatient frequent showers  As needed Zofran, and Mylanta  Continue Protonix, and Bentyl    Nausea & vomiting  Assessment & Plan  Deondre in the setting of cannabinoid hyperemesis syndrome  See plan under abdominal pain    Anxiety  Assessment & Plan  Patient was previously on Lexapro reports that his PCP stopped this however since it was not helping him  He is not interested in speaking with psychiatry at this time  As needed Atarax    Marijuana abuse  Assessment & Plan  Patient reports that last use was on Tuesday however was nearly daily prior to that  Had 1 episode of cannabinoid type emesis syndrome approximately 1 year ago    Lactic acidosis  Assessment & Plan  On admission  Resolved with IV fluids            VTE Prophylaxis:  ambulate   / sequential compression device   Code Status: full code  Discussion with family: NA    Anticipated Length of Stay:  Patient will be admitted on an Observation basis with an anticipated length of stay of  < 2 midnights.   Justification for Hospital Stay: vomitting    Total Time for Visit, including Counseling / Coordination of Care: 70 minutes.  Greater than 50% of this total time spent on direct patient counseling and coordination of care.    Past Medical History:    Past Medical History:   Diagnosis Date    Asthma     Chronic pain     back pain       Chief Complaint:   Abdominal Pain and Vomiting (Pt reports  severe abd pain and vomiting multiple times in the last 12 hours. Was admitted last year for same. Deneis marijuana usage for a few days. )      History of Present Illness:    Lopez Norman is a 43 y.o. male with past medical history of above who presents with Abdominal Pain and Vomiting (Pt reports severe abd pain and vomiting multiple times in the last 12 hours. Was admitted last year for same. Deneis marijuana usage for a few days. )    Patient states he began vomiting last night. Patient states diffuse abdominal pain since last week when he believes he had an allergic reaction to almonds. Patient states he has been on prednisone that he was given at urgent care since last Tuesday. Patient states last Tuesday was his last use of marijuana. Patient states the abdominal pain and vomiting is the same that he has had last year from cannabinoid hyperemesis.He has been unable to tolerate p.o. intake this morning as he just vomits everything up. Patient reports he has had dark urine but denies any hematuria, dysuria urgency or frequency. Patient reports diffuse abdominal pain. Patient offers no other concerns or complaints at this time.  Denies any chest pain chest tightness shortness of breath or difficulty breathing      Review of Systems:    Review of Systems    Past Surgical History:     Past Medical History:   Diagnosis Date    Asthma     Chronic pain     back pain       History reviewed. No pertinent surgical history.    Meds/Allergies:    Prior to Admission medications    Medication Sig Start Date End Date Taking? Authorizing Provider   albuterol (PROVENTIL HFA,VENTOLIN HFA) 90 mcg/act inhaler Inhale 2 puffs every 4 (four) hours as needed for wheezing 10/31/17   Bc Cowan MD   dicyclomine (BENTYL) 10 mg capsule Take 1 capsule (10 mg total) by mouth every 6 (six) hours as needed (abdominal cramps) 7/23/20   Risa Mckeon MD   escitalopram (LEXAPRO) 10 mg tablet Take 1 tablet (10 mg total) by mouth daily  7/23/20   Risa Mckeon MD   pantoprazole (PROTONIX) 40 mg tablet Take 1 tablet (40 mg total) by mouth daily 7/23/20   Risa Mckeon MD     I have reviewed home medications using allscripts.    Allergies:   Allergies   Allergen Reactions    Advil [Ibuprofen] Hives       Social History:     Marital Status: Significant Other   Occupation: N/A  Patient Pre-hospital Living Situation residence  Patient Pre-hospital Level of Mobility: Independent  Patient Pre-hospital Diet Restrictions: None  Substance Use History:   Social History     Substance and Sexual Activity   Alcohol Use Never    Alcohol/week: 0.0 standard drinks of alcohol     Social History     Tobacco Use   Smoking Status Former    Current packs/day: 0.25    Types: Cigarettes   Smokeless Tobacco Never   Tobacco Comments    quit 10/27     Social History     Substance and Sexual Activity   Drug Use Yes    Frequency: 1.0 times per week    Types: Marijuana       Family History:    History reviewed. No pertinent family history.    Physical Exam:     Vitals:   Blood Pressure: 140/77 (03/06/24 1521)  Pulse: 68 (03/06/24 1521)  Temperature: 98 °F (36.7 °C) (03/06/24 1521)  Temp Source: Tympanic (03/06/24 1521)  Respirations: 18 (03/06/24 1521)  SpO2: 100 % (03/06/24 1521)    Physical Exam  Vitals reviewed.   Constitutional:       General: He is not in acute distress.     Appearance: He is normal weight. He is ill-appearing and diaphoretic.   Cardiovascular:      Rate and Rhythm: Normal rate.   Pulmonary:      Effort: No respiratory distress.   Abdominal:      Palpations: Abdomen is soft.   Skin:     General: Skin is warm.      Coloration: Skin is pale.   Neurological:      Mental Status: He is alert. Mental status is at baseline.   Psychiatric:         Mood and Affect: Mood normal.       Additional Data:     Lab Results: I have personally reviewed pertinent reports.      Results from last 7 days   Lab Units 03/06/24  1004   WBC Thousand/uL 12.56*   HEMOGLOBIN g/dL  14.8   HEMATOCRIT % 43.7   PLATELETS Thousands/uL 325   NEUTROS PCT % 73   LYMPHS PCT % 18   MONOS PCT % 6   EOS PCT % 1     Results from last 7 days   Lab Units 03/06/24  1004   SODIUM mmol/L 138   POTASSIUM mmol/L 3.6   CHLORIDE mmol/L 106   CO2 mmol/L 23   BUN mg/dL 13   CREATININE mg/dL 0.92   ANION GAP mmol/L 9   CALCIUM mg/dL 9.3   ALBUMIN g/dL 4.3   TOTAL BILIRUBIN mg/dL 0.81   ALK PHOS U/L 63   ALT U/L 20   AST U/L 13   GLUCOSE RANDOM mg/dL 114                 Results from last 7 days   Lab Units 03/06/24  1256 03/06/24  1004   LACTIC ACID mmol/L 1.5 3.2*       Imaging: I have personally reviewed pertinent reports.      CT abdomen pelvis with contrast   Final Result by Cornelio Marie MD (03/06 1101)      Apparent diffuse urinary bladder wall thickening without associated perivesical inflammatory change, possibly due to underdistention; correlate with patient's symptomatology and urinalysis if cystitis is clinically suspected. Otherwise, no findings of    acute intra-abdominal pathology.      3 mm nonobstructing left intrarenal calculus.      Small hiatal hernia.                           Workstation performed: [id number]           [employer] / [employer] Auditing Records Reviewed: Yes     ** Please Note: This note has been constructed using a voice recognition system. **

## 2024-03-06 NOTE — ASSESSMENT & PLAN NOTE
Nausea vomiting and abdominal pain ongoing for approximately 1 week.  Was seen outpatient at an urgent care and started on Bentyl and steroids with minimal to no improvement.  Presented to the ED today as he was unable to tolerate any p.o. intake    Clear liquid diet  Likely a component of cannabinoid hyperemesis syndrome  Allow outpatient frequent showers  As needed Zofran, and Mylanta  Continue Protonix, and Bentyl

## 2024-03-06 NOTE — ASSESSMENT & PLAN NOTE
Patient was previously on Lexapro reports that his PCP stopped this however since it was not helping him  He is not interested in speaking with psychiatry at this time  As needed Atarax

## 2024-03-06 NOTE — ASSESSMENT & PLAN NOTE
Patient reports that last use was on Tuesday however was nearly daily prior to that  Had 1 episode of cannabinoid type emesis syndrome approximately 1 year ago

## 2024-03-06 NOTE — ED PROVIDER NOTES
History  Chief Complaint   Patient presents with    Abdominal Pain    Vomiting     Pt reports severe abd pain and vomiting multiple times in the last 12 hours. Was admitted last year for same. Deneis marijuana usage for a few days.      Patient is a 43-year-old male with a past medical history of asthma and chronic back pain presenting with evaluation of abdominal pain and vomiting.  Patient states he began vomiting last night.  Patient states diffuse abdominal pain since last week when he believes he had an allergic reaction to almonds.  Patient states he has been on prednisone that he was given at urgent care since last Tuesday.  Patient states last Tuesday was his last use of marijuana.  Patient states the abdominal pain and vomiting is the same that he has had last year from cannabinoid hyperemesis.  Patient states he has been unable to tolerate p.o. intake this morning as he just vomits everything up.  Patient reports he has had dark urine but denies any hematuria, dysuria urgency or frequency.  Patient reports diffuse abdominal pain.  Patient offers no other concerns or complaints at this time.        Prior to Admission Medications   Prescriptions Last Dose Informant Patient Reported? Taking?   albuterol (PROVENTIL HFA,VENTOLIN HFA) 90 mcg/act inhaler   No No   Sig: Inhale 2 puffs every 4 (four) hours as needed for wheezing   dicyclomine (BENTYL) 10 mg capsule   No No   Sig: Take 1 capsule (10 mg total) by mouth every 6 (six) hours as needed (abdominal cramps)   escitalopram (LEXAPRO) 10 mg tablet   No No   Sig: Take 1 tablet (10 mg total) by mouth daily   pantoprazole (PROTONIX) 40 mg tablet   No No   Sig: Take 1 tablet (40 mg total) by mouth daily      Facility-Administered Medications: None       Past Medical History:   Diagnosis Date    Asthma     Chronic pain     back pain       History reviewed. No pertinent surgical history.    History reviewed. No pertinent family history.  I have reviewed and agree  with the history as documented.    E-Cigarette/Vaping     E-Cigarette/Vaping Substances     Social History     Tobacco Use    Smoking status: Former     Current packs/day: 0.25     Types: Cigarettes    Smokeless tobacco: Never    Tobacco comments:     quit 10/27   Substance Use Topics    Alcohol use: Never     Alcohol/week: 0.0 standard drinks of alcohol    Drug use: Yes     Frequency: 1.0 times per week     Types: Marijuana       Review of Systems   Constitutional:  Negative for chills and fever.   HENT:  Negative for ear pain and sore throat.    Eyes:  Negative for pain and visual disturbance.   Respiratory:  Negative for cough and shortness of breath.    Cardiovascular:  Negative for chest pain and palpitations.   Gastrointestinal:  Positive for abdominal pain, nausea and vomiting.   Genitourinary:  Negative for dysuria and hematuria.   Musculoskeletal:  Negative for arthralgias and back pain.   Skin:  Negative for color change and rash.   Neurological:  Negative for seizures and syncope.   All other systems reviewed and are negative.      Physical Exam  Physical Exam  Vitals and nursing note reviewed.   Constitutional:       General: He is not in acute distress.     Appearance: Normal appearance. He is not toxic-appearing or diaphoretic.   HENT:      Head: Normocephalic and atraumatic.      Right Ear: External ear normal.      Left Ear: External ear normal.      Nose: Nose normal.      Mouth/Throat:      Mouth: Mucous membranes are moist.   Eyes:      General: No scleral icterus.        Right eye: No discharge.         Left eye: No discharge.      Conjunctiva/sclera: Conjunctivae normal.   Cardiovascular:      Rate and Rhythm: Normal rate.   Pulmonary:      Effort: Pulmonary effort is normal. No respiratory distress.   Abdominal:      Tenderness: There is generalized abdominal tenderness.   Musculoskeletal:         General: No swelling, deformity or signs of injury. Normal range of motion.      Cervical back:  Normal range of motion and neck supple. No rigidity.   Skin:     General: Skin is warm and dry.      Coloration: Skin is not jaundiced.      Findings: No erythema or rash.   Neurological:      General: No focal deficit present.      Mental Status: He is alert and oriented to person, place, and time. Mental status is at baseline.      Cranial Nerves: No cranial nerve deficit.      Gait: Gait normal.   Psychiatric:         Mood and Affect: Mood normal.         Behavior: Behavior normal.         Thought Content: Thought content normal.         Judgment: Judgment normal.         Vital Signs  ED Triage Vitals   Temperature Pulse Respirations Blood Pressure SpO2   03/06/24 0919 03/06/24 0919 03/06/24 0919 03/06/24 0919 03/06/24 0919   98.2 °F (36.8 °C) 72 16 143/75 100 %      Temp src Heart Rate Source Patient Position - Orthostatic VS BP Location FiO2 (%)   -- 03/06/24 1208 03/06/24 1208 03/06/24 1208 --    Monitor Lying Left arm       Pain Score       --                  Vitals:    03/06/24 0919 03/06/24 1208   BP: 143/75 143/83   Pulse: 72 (!) 53   Patient Position - Orthostatic VS:  Lying         Visual Acuity      ED Medications  Medications   sodium chloride 0.9 % bolus 1,000 mL (0 mL Intravenous Stopped 3/6/24 1104)   haloperidol lactate (HALDOL) injection 5 mg (5 mg Intramuscular Given 3/6/24 0959)   iohexol (OMNIPAQUE) 350 MG/ML injection (MULTI-DOSE) 100 mL (100 mL Intravenous Given 3/6/24 1037)   lactated ringers bolus 1,000 mL (0 mL Intravenous Stopped 3/6/24 1237)   dicyclomine (BENTYL) tablet 20 mg (20 mg Oral Given 3/6/24 1254)   ondansetron (ZOFRAN) injection 4 mg (4 mg Intravenous Given 3/6/24 1303)   haloperidol lactate (HALDOL) injection 5 mg (5 mg Intramuscular Given 3/6/24 1353)       Diagnostic Studies  Results Reviewed       Procedure Component Value Units Date/Time    Lactic acid 2 Hours [[id number]]  (Normal) Collected: 03/06/24 1256    Lab Status: Final result Specimen: Blood from Arm, Right  Updated: 03/06/24 1322     LACTIC ACID 1.5 mmol/L     Narrative:      Result may be elevated if tourniquet was used during collection.    UA w Reflex to Microscopic w Reflex to Culture [[id number]] Collected: 03/06/24 1133    Lab Status: Final result Specimen: Urine, Clean Catch Updated: 03/06/24 1140     Color, UA Light Yellow     Clarity, UA Clear     Specific Gravity, UA 1.017     pH, UA 7.5     Leukocytes, UA Negative     Nitrite, UA Negative     Protein, UA Negative mg/dl      Glucose, UA Negative mg/dl      Ketones, UA Negative mg/dl      Urobilinogen, UA <2.0 mg/dl      Bilirubin, UA Negative     Occult Blood, UA Negative    Lactic acid, plasma (w/reflex if result > 2.0) [[id number]]  (Abnormal) Collected: 03/06/24 1004    Lab Status: Final result Specimen: Blood from Arm, Right Updated: 03/06/24 1047     LACTIC ACID 3.2 mmol/L     Narrative:      Result may be elevated if tourniquet was used during collection.    Comprehensive metabolic panel [[id number]] Collected: 03/06/24 1004    Lab Status: Final result Specimen: Blood from Arm, Right Updated: 03/06/24 1027     Sodium 138 mmol/L      Potassium 3.6 mmol/L      Chloride 106 mmol/L      CO2 23 mmol/L      ANION GAP 9 mmol/L      BUN 13 mg/dL      Creatinine 0.92 mg/dL      Glucose 114 mg/dL      Calcium 9.3 mg/dL      AST 13 U/L      ALT 20 U/L      Alkaline Phosphatase 63 U/L      Total Protein 7.2 g/dL      Albumin 4.3 g/dL      Total Bilirubin 0.81 mg/dL      eGFR 102 ml/min/1.73sq m     Narrative:      [employer] Kid[employer] guidelines for Chronic Kidney Disease (CKD):     Stage 1 with normal or high GFR (GFR > 90 mL/min/1.73 square meters)    Stage 2 Mild CKD (GFR = 60-89 mL/min/1.73 square meters)    Stage 3A Moderate CKD (GFR = 45-59 mL/min/1.73 square meters)    Stage 3B Moderate CKD (GFR = 30-44 mL/min/1.73 square meters)    Stage 4 Severe CKD (GFR = 15-29 mL/min/1.73 square meters)    Stage 5 End Stage CKD (GFR <15 mL/min/1.73 square  meters)  Note: GFR calculation is accurate only with a steady state creatinine    Lipase [[id number]]  (Normal) Collected: 03/06/24 1004    Lab Status: Final result Specimen: Blood from Arm, Right Updated: 03/06/24 1027     Lipase 12 u/L     CBC and differential [[id number]]  (Abnormal) Collected: 03/06/24 1004    Lab Status: Final result Specimen: Blood from Arm, Right Updated: 03/06/24 1013     WBC 12.56 Thousand/uL      RBC 5.45 Million/uL      Hemoglobin 14.8 g/dL      Hematocrit 43.7 %      MCV 80 fL      MCH 27.2 pg      MCHC 33.9 g/dL      RDW 13.5 %      MPV 9.6 fL      Platelets 325 Thousands/uL      nRBC 0 /100 WBCs      Neutrophils Relative 73 %      Immat GRANS % 1 %      Lymphocytes Relative 18 %      Monocytes Relative 6 %      Eosinophils Relative 1 %      Basophils Relative 1 %      Neutrophils Absolute 9.32 Thousands/µL      Immature Grans Absolute 0.08 Thousand/uL      Lymphocytes Absolute 2.21 Thousands/µL      Monocytes Absolute 0.75 Thousand/µL      Eosinophils Absolute 0.13 Thousand/µL      Basophils Absolute 0.07 Thousands/µL                    CT abdomen pelvis with contrast   Final Result by Cornelio Marie MD (03/06 1101)      Apparent diffuse urinary bladder wall thickening without associated perivesical inflammatory change, possibly due to underdistention; correlate with patient's symptomatology and urinalysis if cystitis is clinically suspected. Otherwise, no findings of    acute intra-abdominal pathology.      3 mm nonobstructing left intrarenal calculus.      Small hiatal hernia.                           Workstation performed: [id number]                    Procedures  Procedures         ED Course  ED Course as of 03/06/24 1420   Wed Mar 06, 2024   1057 LACTIC ACID(!!): 3.2   1133 Urine is yellow in coloration.    1217 Nitrite, UA: Negative   1217 Leukocytes, UA: Negative   1325 LACTIC ACID: 1.5   1335 Reports pain and nausea improved after bentyl and zofran, discussed PO challenge.   1342  Tried PO challenge, failed. Began vomiting and retching again               Medical Decision Making    This is a 43-year-old male presenting with evaluation of abdominal pain and vomiting.  Patient states he began vomiting last night.  Patient states diffuse abdominal pain since last week when he believes he had an allergic reaction to almonds.  Patient states he has been on prednisone that he was given at urgent care since last Tuesday.  Patient states last Tuesday was his last use of marijuana. Patient reports he has had dark urine but denies any hematuria, dysuria urgency or frequency.  Patient appears uncomfortable on initial examination but in no acute distress.  Stable vital signs.    Differential diagnosis to include but is not limited to: cannabinoid hyperemesis, gastritis, enteritis, UTI, diverticulitis, electrolyte abnormality    Initial ED Plan: imaging, labs    ED results:  Apparent diffuse urinary bladder wall thickening without associated perivesical inflammatory change, possibly due to underdistention; correlate with patient's symptomatology and urinalysis if cystitis is clinically suspected. Otherwise, no findings of   acute intra-abdominal pathology.  3 mm nonobstructing left intrarenal calculus.  Small hiatal hernia.  UA negative for nitrites and leukocytes. Denies urinary symptoms.     Intractable vomiting after PO challenge, another Haldol dose ordered and discussed observation after patient states he is unable to go home and afraid he will continue to vomit.     Final ED assessment: Patient is stable and well appearing. Discussed radiologic studies and laboratory results. Patient verbalized understanding and is agreeable with the plan for admission. Discussed with Dr. Mendoza, observation, bridging orders placed.      Amount and/or Complexity of Data Reviewed  Labs: ordered. Decision-making details documented in ED Course.  Radiology: ordered.    Risk  Prescription drug management.  Decision  regarding hospitalization.             Disposition  Final diagnoses:   Intractable vomiting with nausea     Time reflects when diagnosis was documented in both MDM as applicable and the Disposition within this note       Time User Action Codes Description Comment    3/6/2024  2:00 PM Violeta Waldrop Add [R11.2] Intractable vomiting with nausea           ED Disposition       ED Disposition   Admit    Condition   Stable    Date/Time   Wed Mar 6, 2024  2:00 PM    Comment   Case was discussed with Dr. Mendoza and the patient's admission status was agreed to be Admission Status: observation status to the service of Dr. Mendoza .               Follow-up Information    None         Patient's Medications   Discharge Prescriptions    No medications on file       No discharge procedures on file.    PDMP Review       None            ED Provider  Electronically Signed by             Violeta Waldrop PA-C  03/06/24 2391

## 2024-03-07 NOTE — ED NOTES
This RN noticed after 20 mins pt being gone. RN checked all bathrooms in the department. Pt eloped no where to be found. This RN called pts phone x2 no answer. Pt still had IV intact. Provider, ED Charge, Security, and Police notified.      Jo Guo  03/06/24 3520
